# Patient Record
Sex: FEMALE | Race: WHITE
[De-identification: names, ages, dates, MRNs, and addresses within clinical notes are randomized per-mention and may not be internally consistent; named-entity substitution may affect disease eponyms.]

---

## 2017-07-25 NOTE — CPEKG
Heart Rate: 68

RR Interval: 882

P-R Interval: 176

QRSD Interval: 90

QT Interval: 404

QTC Interval: 430

P Axis: 64

QRS Axis: 3

T Wave Axis: 33

EKG Severity - NORMAL ECG -

EKG Impression: SINUS RHYTHM

Electronically Signed By: Teo Sharma 27-Jul-2017 11:23:19

## 2017-07-25 NOTE — EDPHY
H & P


Stated Complaint: here for multi system complaints- please call  [phone]


Source: Patient





- Personal History


Current Tetanus Diphtheria and Acellular Pertussis (TDAP): Unsure





- Medical/Surgical History


Hx Asthma: Yes


Hx Chronic Respiratory Disease: No


Hx Diabetes: No


Hx Cardiac Disease: No


Hx Renal Disease: No


Hx Cirrhosis: No


Hx Alcoholism: No


Hx HIV/AIDS: No


Hx Splenectomy or Spleen Trauma: No


Other PMH: aortic iliac stents, cardiac cath 10/5/2015,angioseal, laminectomy, 

right knee injury, LEFT SHOULDER SURG.  chronic pain/PTSD/HEad injury





- Family History


Significant Family History: No pertinent family hx





- Social History


Smoking Status: Former smoker


Time Seen by Provider: 07/25/17 13:28


HPI/ROS: 





CHIEF COMPLAINT:  Sore throat, cough, leg swelling





History by patient





HISTORY OF PRESENT ILLNESS:  57-year-old woman with a history of coronary 

artery disease, bilateral femoral stents, former smoker he has been having 

several months of scratchy throat, hoarse voice and cough and has been on 

multiple rounds of antibiotics presents today because while she was on vacation 

she noticed new leg swelling and she has had persistent cough and white exudate 

in her throat. Patient has been using inhalers and nebulizers felt relief until 

last night when the nebulizer seem to help somewhat.  She denies any fever.  

She also states that she has been having frequent urination.  Patient is 

somewhat angry and scattered in her history and states that she has a history 

of a head injury so getting a clear history is difficult.





REVIEW OF SYSTEMS:


As in HPI, and all other systems reviewed and are negative (Julia Ling)





- Physical Exam


Exam: 





General Appearance:  Alert, nontoxic appearing, speaking full sentences.


Eyes:  Pupils equal and round no pallor or injection.


ENT, Mouth:  Mucous membranes moist.


Respiratory:  Normal, effort, lungs are clear to auscultation. Diffuse wheezes 

and rhonchi.


Cardiovascular:  Regular rate and rhythm. S1, S2


Gastrointestinal:  Abdomen is soft and nontender, no masses, bowel sounds 

normal.


Back: No CVA tenderness, no bony tenderness


Neurological:  Awake, alert and oriented x 3, no pronator drift, normal gait, 

no pronator drift


Skin:  Warm and dry, no rashes.


Musculoskeletal:  Neck is supple nontender. No deformities.


Extremitie:s full range of motion, 1+ pitting edema to the knees, DP and PT 

pulses 2+ and equal bilaterally, feet are warm


Psychiatric:  Patient has normal affect, there is no agitation. (Julia Ling)


Constitutional: 


 Initial Vital Signs











Temperature (C)  36.6 C   07/25/17 13:11


 


Heart Rate  85   07/25/17 13:11


 


Respiratory Rate  20   07/25/17 13:11


 


Blood Pressure  125/76 H  07/25/17 13:11


 


O2 Sat (%)  95   07/25/17 13:11








 











O2 Delivery Mode               Room Air














Allergies/Adverse Reactions: 


 





Penicillins Allergy (Severe, Verified 05/22/16 11:44)


 ANAPHYLACTIC


meperidine HCl [From Demerol] Allergy (Intermediate, Verified 05/22/16 11:44)


 ANXIOUS


prednisone [Prednisone] Allergy (Intermediate, Verified 05/22/16 11:44)


 Hives


Sulfa (Sulfonamide Antibiotics) Allergy (Intermediate, Verified 05/22/16 11:44)


 NAUSEA


clopidogrel bisulfate [From Plavix] Allergy (Verified 05/22/16 11:44)


 


Quinolones Allergy (Verified 05/22/16 11:44)


 


ofptiray dye Allergy (Uncoded 04/05/16 13:09)


 


steroids Allergy (Uncoded 04/05/16 13:09)


 








Home Medications: 














 Medication  Instructions  Recorded


 


Aspirin [Aspirin 81mg (*)] 81 mg PO DAILY 10/05/15


 


Diclofenac Sodium 1% [Voltaren Gel 2 bessie TP QID 10/05/15





(*)]  


 


Ergocalciferol (Vitamin D2) 400 unit PO DAILY 10/05/15





[Vitamin D2]  


 


Herbals/Supplements -Info Only 1 ea PO DAILY 10/05/15


 


Multivitamins [Multivitamin (*)] 1 each PO DAILY 10/05/15


 


Pantoprazole Sodium [Protonix 40mg 40 mg PO BID 10/05/15





(*)]  


 


clonazePAM [Klonopin (*)] 0.5 mg PO QID 10/05/15


 


oxyCODONE IR [Oxycodone Ir (*)] 30 mg PO Q4H PRN 10/05/15


 


Hydromorphone HCl [Exalgo] 12 mg PO DAILY 10/06/15


 


Effient  10/22/15


 


Neurontin  10/22/15


 


Clindamycin HCl [Clindamycin] 300 mg PO 05/22/16


 


Estradiol [Estradiol 1 MG (RX)] 1 mg PO DAILY 05/22/16


 


Ondansetron Odt [Zofran Odt] 4 mg PO Q6 PRN #10 tab 05/22/16














Medical Decision Making





- Diagnostics


Imaging Results: 


 Imaging Impressions





Chest X-Ray  07/25/17 13:59


Impression: Minimal left basilar atelectasis. Otherwise negative.











ED Course/Re-evaluation: 





57-year-old woman with coronary disease and vascular disease who presents today 

with multitude of complaints, in particular cough who is well appearing, 

without evidence of hypoxia or systemic toxicity and normal vital signs. In 

order to make sure that the patient's pulmonary symptoms and leg swelling were 

not related to underlying heart failure ECG, chest x-ray and labs were 

performed.  ECG showed no evidence of acute ischemia.  Chest x-ray showed 

evidence of hyperinflation but no evidence of pneumonia or CHF.  I transferred 

care to Dr. Kwok pending results patient's laboratory tests for final 

disposition. (Julia Ling)





Patient's care transferred to me at 3:30 p.m..  Discussed with the patient the 

results of her blood tests and x-rays.  Overall she is well-appearing and has a 

normal exam and vital signs. I suggested that her wheezing that improved with 

albuterol and lack of pulmonary findings on x-ray other than hyperinflation 

suggests COPD.  She has a history of smoking and currently smells like smoke.  

She denies this.  I offered to treat with a short course of steroids which she 

refused because she states that steroids make her feel crazy.  We discussed 

long versus short-term use.  She states that she has been on multiple 

antibiotics recently provided by her primary physician and that they have not 

improved her symptoms. She also wanted to provide a stool sample to be checked 

for C diff.  This was done.  She also asked me why her face was bruising after 

Botox injections and we discussed the possible complications and side effects 

of such injections.  She is not happy that we do not have any other options for 

treatment.  She is essentially refusing my treatment plan and does not agree 

with my diagnosis of COPD.  I will refer her back to her primary doctor 

[facility] at this time.  We discussed indications for returning to the 

emergency department. (Jamil Kwok)


Differential Diagnosis: 





Partial list of the Differential diagnosis considered include but were not 

limited to;  anxiety, COPD, bronchitis, pneumonia, bruising and although 

unlikely based on the history and physical exam, I also considered PE, acute 

coronary disease, DVT, cellulitis.  I discussed these differential diagnoses 

and the plan with the patient as well as the usual and expected course.  The 

patient understands that the diagnosis is provisional and that in medicine we 

are not always correct and that further workup is often warranted.  Usual and 

customary warnings were given.  All of the patient's questions were answered.  

The patient was instructed to return to the emergency department should the 

symptoms at all worsen or return, otherwise to followup with the physician as 

we discussed. (Jamil Kwok)





- Data Points


Laboratory Results: 


 Laboratory Results





 07/25/17 14:45 





 07/25/17 14:45 





 











  07/25/17 07/25/17 07/25/17





  Unknown 15:05 14:45


 


WBC      





    


 


RBC      





    


 


Hgb      





    


 


Hct      





    


 


MCV      





    


 


MCH      





    


 


MCHC      





    


 


RDW      





    


 


Plt Count      





    


 


MPV      





    


 


Neut % (Auto)      





    


 


Lymph % (Auto)      





    


 


Mono % (Auto)      





    


 


Eos % (Auto)      





    


 


Baso % (Auto)      





    


 


Nucleat RBC Rel Count      





    


 


Absolute Neuts (auto)      





    


 


Absolute Lymphs (auto)      





    


 


Absolute Monos (auto)      





    


 


Absolute Eos (auto)      





    


 


Absolute Basos (auto)      





    


 


Absolute Nucleated RBC      





    


 


Immature Gran %      





    


 


Immature Gran #      





    


 


Sodium      





    


 


Potassium      





    


 


Chloride      





    


 


Carbon Dioxide      





    


 


Anion Gap      





    


 


BUN      





    


 


Creatinine      





    


 


Estimated GFR      





    


 


Glucose      





    


 


Calcium      





    


 


Magnesium      





    


 


Total Bilirubin      





    


 


Conjugated Bilirubin      





    


 


Unconjugated Bilirubin      





    


 


AST      





    


 


ALT      





    


 


Alkaline Phosphatase      





    


 


Troponin I      





    


 


NT-Pro-B Natriuret Pep      





    


 


Total Protein      





    


 


Albumin      





    


 


Urine Color    YELLOW   





    


 


Urine Appearance    CLEAR   





    


 


Urine pH    6.5   





    (5.0-7.5)  


 


Ur Specific Gravity    <= 1.005   





    (1.002-1.030)  


 


Urine Protein    NEGATIVE   





    (NEGATIVE)  


 


Urine Ketones    NEGATIVE   





    (NEGATIVE)  


 


Urine Blood    NEGATIVE   





    (NEGATIVE)  


 


Urine Nitrate    NEGATIVE   





    (NEGATIVE)  


 


Urine Bilirubin    NEGATIVE   





    (NEGATIVE)  


 


Urine Urobilinogen    0.2 EU EU  





    (0.2-1.0)  


 


Ur Leukocyte Esterase    NEGATIVE   





    (NEGATIVE)  


 


Urine RBC    NONE SEEN /hpf /hpf  





    (0-3)  


 


Urine WBC    NONE SEEN /hpf /hpf  





    (0-3)  


 


Ur Epithelial Cells    TRACE /lpf /lpf  





    (NONE-1+)  


 


Urine Glucose    NEGATIVE   





    (NEGATIVE)  


 


Group A Strep Screen      NEGATIVE 





     (NEGATIVE) 


 


Group A Strep DNA  Pending     





    














  07/25/17 07/25/17





  14:45 14:45


 


WBC    5.57 10^3/uL 10^3/uL





    (3.80-9.50) 


 


RBC    3.87 10^6/uL L 10^6/uL





    (4.18-5.33) 


 


Hgb    12.7 g/dL g/dL





    (12.6-16.3) 


 


Hct    36.4 % L %





    (38.0-47.0) 


 


MCV    94.1 fL fL





    (81.5-99.8) 


 


MCH    32.8 pg pg





    (27.9-34.1) 


 


MCHC    34.9 g/dL g/dL





    (32.4-36.7) 


 


RDW    12.5 % %





    (11.5-15.2) 


 


Plt Count    178 10^3/uL 10^3/uL





    (150-400) 


 


MPV    9.0 fL fL





    (8.7-11.7) 


 


Neut % (Auto)    60.7 % %





    (39.3-74.2) 


 


Lymph % (Auto)    27.3 % %





    (15.0-45.0) 


 


Mono % (Auto)    9.9 % %





    (4.5-13.0) 


 


Eos % (Auto)    0.5 % L %





    (0.6-7.6) 


 


Baso % (Auto)    1.1 % %





    (0.3-1.7) 


 


Nucleat RBC Rel Count    0.0 % %





    (0.0-0.2) 


 


Absolute Neuts (auto)    3.38 10^3/uL 10^3/uL





    (1.70-6.50) 


 


Absolute Lymphs (auto)    1.52 10^3/uL 10^3/uL





    (1.00-3.00) 


 


Absolute Monos (auto)    0.55 10^3/uL 10^3/uL





    (0.30-0.80) 


 


Absolute Eos (auto)    0.03 10^3/uL 10^3/uL





    (0.03-0.40) 


 


Absolute Basos (auto)    0.06 10^3/uL 10^3/uL





    (0.02-0.10) 


 


Absolute Nucleated RBC    0.00 10^3/uL 10^3/uL





    (0-0.01) 


 


Immature Gran %    0.5 % %





    (0.0-1.1) 


 


Immature Gran #    0.03 10^3/uL 10^3/uL





    (0.00-0.10) 


 


Sodium  136 mEq/L mEq/L  





   (134-144)  


 


Potassium  4.1 mEq/L mEq/L  





   (3.5-5.2)  


 


Chloride  102 mEq/L mEq/L  





   ()  


 


Carbon Dioxide  24 mEq/l mEq/l  





   (22-31)  


 


Anion Gap  10 mEq/L mEq/L  





   (8-16)  


 


BUN  11 mg/dL mg/dL  





   (7-23)  


 


Creatinine  0.7 mg/dL mg/dL  





   (0.6-1.0)  


 


Estimated GFR  > 60   





   


 


Glucose  96 mg/dL mg/dL  





   ()  


 


Calcium  8.3 mg/dL L mg/dL  





   (8.5-10.4)  


 


Magnesium  1.8 mg/dL mg/dL  





   (1.6-2.3)  


 


Total Bilirubin  0.4 mg/dL mg/dL  





   (0.1-1.4)  


 


Conjugated Bilirubin  0.2 mg/dL mg/dL  





   (0.0-0.5)  


 


Unconjugated Bilirubin  0.2 mg/dL mg/dL  





   (0.0-1.1)  


 


AST  22 IU/L IU/L  





   (14-46)  


 


ALT  39 IU/L IU/L  





   (9-52)  


 


Alkaline Phosphatase  49 IU/L IU/L  





   ()  


 


Troponin I  < 0.012 ng/mL ng/mL  





   (0-0.034)  


 


NT-Pro-B Natriuret Pep  371 pg/mL H pg/mL  





   (0-125)  


 


Total Protein  5.8 g/dL L g/dL  





   (6.3-8.2)  


 


Albumin  3.5 g/dL g/dL  





   (3.5-5.0)  


 


Urine Color    





   


 


Urine Appearance    





   


 


Urine pH    





   


 


Ur Specific Gravity    





   


 


Urine Protein    





   


 


Urine Ketones    





   


 


Urine Blood    





   


 


Urine Nitrate    





   


 


Urine Bilirubin    





   


 


Urine Urobilinogen    





   


 


Ur Leukocyte Esterase    





   


 


Urine RBC    





   


 


Urine WBC    





   


 


Ur Epithelial Cells    





   


 


Urine Glucose    





   


 


Group A Strep Screen    





   


 


Group A Strep DNA    





   











Medications Given: 


 








Discontinued Medications





Albuterol/Ipratropium (Duoneb)  3 ml IH EDNOW ONE


   Stop: 07/25/17 14:03


   Last Admin: 07/25/17 14:59 Dose:  3 ml








Departure





- Departure


Disposition: Home, Routine, Self-Care


Clinical Impression: 


Reactive airway disease


Qualifiers:


 Asthma severity: mild persistent Asthma complication type: with acute 

exacerbation Qualified Code(s): J45.31 - Mild persistent asthma with (acute) 

exacerbation





Condition: Fair


Instructions:  Reactive Airways Disease (ED)


Referrals: 


NONE *PRIMARY CARE P,. [Primary Care Provider] - As per Instructions


Severiano Kennedy MD [Medical Doctor] - As per Instructions

## 2018-08-26 NOTE — GHP
[f rep st]



                                                            HISTORY AND PHYSICAL





DATE OF ADMISSION:  08/26/2018



CHIEF COMPLAINT:  Headache.



HISTORY OF PRESENT ILLNESS:  This is a 58-year-old female with a history of multiple spinal surgeries
 who presents with a headache.  This started on August 1st suddenly.  Since then, she has seen her [facility] Physician a few times.  She had a brain MRI, which was performed here on August 2nd.  This
 showed a cyst in the inferior aspect of the right basal ganglia, but nothing additional.  She additi
onally saw Dr. Paul Schofield who performed a laryngoscopy multiple times looking for a sinus infection.
  He did not see anything clear.  She has had a few courses of Z-Shyam without improvement.  She takes 
chronic narcotics, has continued these including morphine 60 twice daily as well as oxycodone multipl
e times throughout the day.  She has a history of a cervical spine fusion in 2001.  This was C5 and 6
.  Her headache is associated with multiple additional complaints including nausea, vomiting, she tho
ught she was constipated at one point, she has chronic numbness in her upper extremities, which is wo
rse at night.  She does report new bilateral symmetric upper extremity weakness.  She has not had any
 changes in her vision.  She describes the pain being on the right side of her head and face, which i
s worse when she turns her head to the right or to the left.



PAST MEDICAL/SURGICAL HISTORY:  

1.  Chronic pain on continuous narcotics.

2.  Peripheral vascular disease as well as coronary artery disease, no intervention.

3.  Aortic insufficiency.

4.  C5-6 fusion.

5.  Lumbar stenosis status post fusion.

6.  Arthritis.

7.  GERD.

8.  Hyperlipidemia.



MEDICATIONS:  Please see medication reconciliation.



ALLERGIES:  Penicillin, Demerol, prednisone, sulfa, Plavix, quinolones, steroids.



FAMILY HISTORY:  Reviewed and noncontributory.



SOCIAL HISTORY:  She lives by herself.  She does not drink or smoke.  She is a retired nurse.  She us
ed to work at [employer].



REVIEW OF SYSTEMS:  10-point Review of Systems is conducted and is negative except per HPI.



PHYSICAL EXAM:  VITAL SIGNS:  Blood pressure 133/77, heart rate 64, respiration rate 18, saturating 9
3% on room air.  Temperature 36.6.  GENERAL:  The patient is a pleasant female who appears somewhat u
ncomfortable intermittently holding the right side of her face in mild distress.  HEENT:  Shows her t
o be normocephalic, atraumatic.  CARDIOVASCULAR:  Regular rate and rhythm.  There are no murmurs, rub
s, or gallops.  PULMONARY:  Lungs clear to auscultation bilaterally.  ABDOMEN:  Soft, nontender, nond
istended.  SKIN:  Shows no rash.  :  No Moody.  NEUROLOGIC EXAM:  Shows her to be alert and oriente
d x3.  Cranial nerves 2 through 12 are grossly intact minus some subjective numbness on the right kenny
e of her face.  Strength in her biceps and triceps is weak, but symmetric bilaterally in her hands th
at is strong.  She has no subjective sensation loss in her upper extremities.  PSYCHIATRIC: Shows her
 to be mildly distressed, otherwise, her mood and affect are normal.



LABORATORY DATA:  CBC is normal.  ESR is 10, CRP is 6, basic metabolic panel is normal.



DATA:  

1.  Discussed with Lucy Nicole, will admit to med/surg with a Neurosurgery consult.

2.  I personally reviewed and interpreted her chest x-ray.  This shows mild bronchitis.

3.  I reviewed her head CT scan.  This shows no sinusitis, nothing acute.

4.  I reviewed her head and neck CT angiograms.  This shows C4 through 5 moderate central canal steno
sis with severe right neural foraminal stenosis, with both the CT angiogram portion of the head and n
da being normal.



IMPRESSION AND PLAN:  

1.  Headache/neck pain:  Unclear etiology.  She has a right basal ganglia cyst, which I do not believ
e it is causing this pain.  She does have a right-sided C4-5 foraminal stenosis, this is potentially 
implicated.  Will order an MRI of her cervical spine to further evaluate.  Dr. Garcia with Neurosurger
y has already been consulted, he will see the patient in consultation.  She does not have any red fla
g neurologic signs.  Could consider involving Neurology if her neck pathology is not felt to be the c
ause of her pain.  Otherwise for now, will consider high dosed outpatient narcotics give her addition
al medications for pain relief.

2.  Chronic pain on continuous narcotics:  We will continue these.

3.  Gastroesophageal reflux disease:  Protonix.

4.  Peripheral vascular disease and coronary artery disease:  I do not see that she is on aspirin.  JÚNIOR daniels discuss this with her further.

5.  Venous thromboembolism risk is moderate.  She is admitted, and obvious if she stays beyond 1 day 
she would need venous thromboembolism prophylaxis.





Job #:  [id number]

## 2018-08-26 NOTE — EDPHY
H & P


Smoking Status: Former smoker


Time Seen by Provider: 08/26/18 09:27


HPI/ROS: 





CHIEF COMPLAINT:  Headache, dizziness, weakness





HISTORY OF PRESENT ILLNESS:  58-year-old female presents to the emergency 

department by private vehicle complaining of severe headache and dizziness.  

Symptoms began over 3 weeks ago.  She states that she was getting out of a car 

and developed dizziness and pain in the right side of her head.  She has seen 

by a neurologist.  She has been seen by ENT twice.  She has seen her primary 

care provider for this multiple times.  She states "I just can't take it anymore

".  She denies any reported trauma.  She was told that this could be related to 

a sinus infection and has bee on 2 rounds of antibiotics.  The she states that 

this all started shortly after she had some allergy testing which caused her to 

have multiple episodes of vomiting.  She has not vomited since this headache 

started.  She has taken numerous allergy medications without relief.  No visual 

changes.  She has chronic neck and back pain.  She feels like her neck is very 

stiff.  Denies abdominal pain.  She overall feels weak





REVIEW OF SYSTEMS:


Constitutional:  No fever, no chills.


Eyes:  No double or blurry vision.


ENT:  No sore throat.


Respiratory:  No cough, no shortness of breath.


Cardiac:  No chest pain.


Gastrointestinal:  No abdominal pain, vomiting or diarrhea.


Genitourinary:  No dysuria.


Musculoskeletal:  No neck or back pain.


Skin:  No rashes.


Neurological:  headache. (KristyLucy)


Past Medical/Surgical History: 





Chronic neck and back pain, narcotic dependence, aortic iliac stents, cardiac 

catheterization 2015, orthopedic surgeries, laminectomy, head injury, PTSD (

DaniaLucy de leon)


Social History: 





Single and lives in [place].  She is retired nurse. (DaniaLucy de leon)


Physical Exam: 





General Appearance:  Alert, no distress.  Resting a darkened room.


Eyes:  Pupils equal and round.  Extraocular motions are all intact.


ENT:  Mouth:  Mucous membranes moist.


Respiratory:  No wheezing, rhonchi, or rales, lungs are clear to auscultation.


Cardiovascular:  Regular rate and rhythm.


Gastrointestinal:  Abdomen is soft and nontender, no masses, no rebound or 

guarding, bowel sounds normal.


Neurological:  Alert and oriented x 3, cranial nerves II through XII grossly 

intact


Skin:  Warm and dry, no rashes.


Musculoskeletal:  Nontender to palpate along the cervical, thoracic or lumbar 

spine.  Neck is supple.


Extremities:  Full range of motion and no peripheral edema.


Psychiatric:  Patient is oriented X 3, there is no agitation. (Lucy Wagner)


Constitutional: 





 Initial Vital Signs











Temperature (C)  36.7 C   08/26/18 08:42


 


Heart Rate  101 H  08/26/18 08:42


 


Respiratory Rate  18   08/26/18 08:42


 


Blood Pressure  158/84 H  08/26/18 08:42


 


O2 Sat (%)  96   08/26/18 08:42








 











O2 Delivery Mode               Room Air


 


O2 (L/minute)                  2














Allergies/Adverse Reactions: 


 





Penicillins Allergy (Severe, Verified 08/26/18 08:45)


 ANAPHYLACTIC


meperidine HCl [From Demerol] Allergy (Intermediate, Verified 08/26/18 08:45)


 ANXIOUS


prednisone [Prednisone] Allergy (Intermediate, Verified 08/26/18 08:45)


 Hives


Sulfa (Sulfonamide Antibiotics) Allergy (Intermediate, Verified 08/26/18 08:45)


 NAUSEA


clopidogrel bisulfate [From Plavix] Allergy (Verified 08/26/18 08:45)


 


ioversol [From Optiray 160] Allergy (Verified 08/27/18 16:30)


 


Quinolones Allergy (Verified 08/26/18 08:45)


 


steroids Allergy (Uncoded 04/05/16 13:09)


 








Home Medications: 














 Medication  Instructions  Recorded


 


Albuterol [Proventil Inhaler HFA 1 - 2 puffs IH Q4H PRN 08/26/18





(*)]  


 


Diclofenac Sodium 1% [Voltaren Gel 4 gm TP QID PRN 08/26/18





(*)]  


 


Fluticasone/Salmeter 250/50Mcg 1 puffs IH BID PRN 08/26/18





[Advair 250/50 (*)]  


 


Naratriptan HCl [Amerge] 2.5 mg PO ONCE PRN 08/26/18


 


Pantoprazole Sodium [Protonix 40mg 40 mg PO BIDMEAL 08/26/18





(*)]  


 


morphINE SR [MS Contin/Oramorph 60 60 mg PO BID@06,18 08/26/18





mg (*)]  


 


oxyCODONE IR [Oxycodone Ir (*)] 10 mg PO HS PRN 08/26/18


 


oxyCODONE IR [Oxycodone Ir (*)] 10 mg PO QID@06,10,14,18 08/26/18














Medical Decision Making





- Diagnostics


Imaging: Discussed imaging studies w/ On call Radiologist, I viewed and 

interpreted images myself





- Diagnostics


Imaging Results: 





 Imaging Impressions





Therapeutic Injection  08/27/18 08:37


Impression:  C7-T1 epidural steroid injection performed, as above.








Face CT  08/27/18 15:02


Impression:  


1.  Suspect dental caries involving the crowns of the right 1st and 2nd 

premolars in the mandibular arch (#28 and 29).


2.  No evidence of osteomyelitis, subperiosteal abscess, or mass.


3.  Patent venous system and carotid arteries.


 


Findings discussed with physician, Jt Duran M.D., on August 27, 2018 at 

1752.


 


 


 











ED Course/Re-evaluation: 





58-year-old female presents emergency department with severe chronic right-

sided headache for the last 3-4 weeks.  She has been seen by neurologist as 

well as primary care provider multiple times.  She has been scoped by ENT 

twice.  She has been on multiple antibiotics.





CT imaging of the brain as well as CTA of the head neck reveals no evidence of 

intracranial bleeding.  She has significant degenerative changes in the 

cervical spine and specifically has right-sided severe neural foraminal 

stenosis at C4-5.





The patient was given IV 5 mg Valium without relief.





Patient has a history of chronic pain and she took her own morphine and 

oxycodone as scheduled.





The patient does not feel that her pain is adequately controlled.  She does not 

feel comfortable going home.  Patient will be admitted to the hospitalist for 

pain management.  I also spoke with the on-call neurosurgeon, Dr. Thierry Garcia

, who reviewed will review the patient's CT scan but does not feel that her 

headache is likely related to degenerative changes in her cervical spine.  She 

likely will require MRI of the cervical spine while she is in the hospital.  I 

do not think these images need to be done in the emergency department.





ESR and CRP are both within normal limits.  CBC and chemistries are normal.





The case was discussed with Dr. Paul Blackman, secondary supervising physician, 

who did not directly evaluate the patient but agrees with treatment and plan. (

Lucy Wagner)





I did not see this patient while she was in the emergency department.  However 

her care was discussed with the PA while the patient was in the department.  I 

agree with treatment plan and management (Paul Blackman)


Differential Diagnosis: 





Headache including but not limited to subarachnoid hemorrhage, migraine headache

, tension headache and infectious causes such as meningitis, pharyngitis and 

sinusitis. (Lucy Wagner)





- Data Points


Laboratory Results: 





 Laboratory Results





 08/26/18 09:53 





 08/26/18 09:53 








Medications Given: 





 





Hydrocodone Bitart/Acetaminophen (Norco 5/325)  1 - 2 tab PO Q3HRS PRN


   PRN Reason: Pain, Moderate Able to Take PO


   Stop: 09/05/18 16:36


   Last Admin: 08/27/18 15:54 Dose:  2 tab


Diazepam (Valium)  5 mg PO Q6HRS PRN


   PRN Reason: Anxiety, Able to Take PO


   Stop: 02/23/19 15:01


   Last Admin: 08/27/18 15:53 Dose:  5 mg


Doxycycline Hyclate (Doxycycline Hyclate)  100 mg PO BID JAMES


   PRN Reason: Protocol


   Stop: 09/26/18 20:59


   Last Admin: 08/27/18 21:03 Dose:  100 mg


Hydromorphone HCl (Dilaudid)  0.5 - 1 mg IVP Q2HRS PRN


   PRN Reason: Pain, Severe


   Stop: 09/05/18 16:36


   Last Admin: 08/27/18 12:11 Dose:  1 mg


Methocarbamol (Robaxin)  750 mg PO TID PRN


   PRN Reason: muscle pain


   Stop: 02/22/19 21:59


   Last Admin: 08/27/18 21:03 Dose:  750 mg


Morphine Sulfate (Ms Contin/Oramorph)  60 mg PO BID@06,18 JAMES


   Stop: 09/05/18 17:59


   Last Admin: 08/27/18 18:14 Dose:  60 mg


Oxycodone HCl (Oxycodone Ir)  5 - 10 mg PO Q3HRS PRN


   PRN Reason: Pain, Severe Able to Take PO


   Stop: 09/05/18 16:36


   Last Admin: 08/27/18 21:03 Dose:  10 mg


Oxycodone HCl (Oxycodone Ir)  10 mg PO QID@06,10,14,18 JAMES


   Stop: 09/05/18 17:59


   Last Admin: 08/27/18 18:14 Dose:  10 mg


Pantoprazole Sodium (Protonix)  40 mg PO BIDMEAL JAMES


   Stop: 02/22/19 17:59


   Last Admin: 08/27/18 18:14 Dose:  40 mg





Discontinued Medications





Clonazepam (Klonopin)  0.5 mg PO ONCE ONE


   Stop: 08/26/18 20:24


   Last Admin: 08/26/18 21:58 Dose:  0.5 mg


Diazepam (Valium)  5 mg IVP EDNOW ONE


   Stop: 08/26/18 14:15


   Last Admin: 08/26/18 14:18 Dose:  5 mg


Diphenhydramine HCl (Benadryl Injection)  25 mg IVP EDNOW ONE


   Stop: 08/26/18 11:01


   Last Admin: 08/26/18 11:07 Dose:  25 mg


Diphenhydramine HCl (Benadryl Injection)  50 mg IVP ONCE ONE


   Stop: 08/27/18 15:47


   Last Admin: 08/27/18 15:53 Dose:  50 mg


Hydromorphone HCl (Dilaudid)  1 mg IVP EDNOW ONE


   Stop: 08/26/18 10:05


   Last Admin: 08/26/18 10:12 Dose:  1 mg


Morphine Sulfate (Ms Contin/Oramorph)  60 mg PO ONCE ONE


   Stop: 08/26/18 11:01


   Last Admin: 08/26/18 11:36 Dose:  60 mg


Oxycodone HCl (Oxycodone Ir)  10 mg PO EDNOW ONE


   Stop: 08/26/18 11:03


   Last Admin: 08/26/18 11:06 Dose:  10 mg


Oxycodone HCl (Oxycodone Ir)  10 mg PO ONCE ONE


   Stop: 08/26/18 15:40


   Last Admin: 08/26/18 15:44 Dose:  10 mg








Departure





- Departure


Disposition: [facility]


Clinical Impression: 


 Dizziness





Headache


Qualifiers:


 Headache type: unspecified Headache chronicity pattern: acute headache 

Intractability: intractable Qualified Code(s): R51 - Headache





Condition: Good

## 2018-08-27 NOTE — ASMTCMCOM
CM Note

 

CM Note                       

Notes:

Pt admitted with headached and rt sided neck pain. Neurosurgery consulting. DC plan is TBD.

 

Date Signed:  08/27/2018 11:25 AM

Electronically Signed By:Nu Lares LCSW

## 2018-08-27 NOTE — PDMN
Medical Necessity


Medical necessity: [facility] GR57 y/o w/ 

severe H/A and neck pain of unclear etiology, noted to have right basal ganglia 

cyst, prob unrelated, right sided C4-5 formaminal stenosis, neurosurgery 

consult - will treat with steroid injection via IR but cont to watch, if does 

not work need further workup from other specialists.  PT/OT ordered, manage 

pain with high dose opioids, hx PVD and CAD, chronic pain on narcotics, C5-6 

fusion.  Status change to inpatient 18 @ 1504 per MD order to cont to 

monitor, dx and treat

## 2018-08-27 NOTE — GCON
[f rep st]



                                                                    CONSULTATION





NEUROSURGERY CONSULTATION NOTE





CHIEF COMPLAINT:  Neck pain and scapular pain.



HISTORY OF PRESENT ILLNESS:  This is a 58-year-old female who has a prior cervical fusion at C5-6, th
at was performed in 1999 and 2001 by an outside surgeon.  She states that she was doing well until Au
gust 1st, when she states that she stepped out of her car and had extreme right-sided neck pain and r
ight-sided arm pain that radiated to mostly her scapular region.  The patient also states that she ha
s had some swelling and pain in the right side of her face, as well as her ear.  She came in to the 
ospital last night with more severe worsening of this pain.  Neurosurgery service was consulted origi
gisel for the patient having a severe headache, but later we were consulted due to some adjacent leve
l segment disease that was seen on her CT scan at C4-5.  Currently the patient states that she has aw
ful pain all over.  She states that she does have weakness in her arms, but also states that she has 
a biceps tendon rupture in her right arm, and this contributes to some of her weakness in her right a
rm.  Mostly, she states she has excruciating pain in the right side of her neck going down to her sca
pular region, and sometimes all the way down her arm into her hands.  She denies any inciting event, 
such as any falls or recent trauma.  She denies any loss of bowel or bladder control.  She denies any
 problems with her legs.



REVIEW OF SYSTEMS:  All pertinent positive and negative review of systems are as stated in the HPI.



PAST MEDICAL HISTORY:  The patient has a past medical history of cervical stenosis with radiculopathy
 at C5-6 and chronic pain, peripheral vascular disease, as well as coronary artery disease, aortic in
sufficiency, lumbar stenosis, status post fusion, arthritis, GERD, and hyperlipidemia.



CURRENT HOME MEDICATIONS:  Please see the current home medication reconciliation report.



FAMILY HISTORY:  Reviewed and is noncontributory to this admission.



SOCIAL HISTORY:  Patient lives by herself.  She denies any alcohol or tobacco use.  She states that s
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



ALLERGIES:  Patient has allergies to penicillin, demerol, prednisone, sulfa, Plavix, quinolones, and 
steroids except for Kenalog.



OBJECTIVE:  VITAL SIGNS:  Blood pressure 101/55, heart rate 63, respiratory rate 16, O2 saturation is
 94% on room air, temperature is 36.8 degrees Celsius.  CONSTITUTIONAL:  Patient is an alert and orie
nted, well-developed, well-nourished female, in no acute distress.  She is speaking fluently.  She is
 moving all extremities x4 to command.  HEENT:  Head is normocephalic, atraumatic.  Eyes:  Pupils are
 equal and reactive to light and accommodation.  Extraocular muscles are intact.  NECK:  Tender to pa
lpation over the right paracervical muscles in midline.  She is less tender over the left side of her
 cervical spine and paracervical muscles.  She does have full range of motion, but this is with pain.
  MUSCULOSKELETAL:  Gross neurologic motor exam bilateral upper extremities are 5/5, equal in strengt
h, except for her right biceps, which she noted to have a biceps tendon rupture.  Her arms often milton
e during her exam due to pain.  Otherwise though her muscle groups are 5/5 and equal in strength in d
eltoids, triceps, wrist extensors, flexors, interossei and .  There is negative Jimmie.  Deep t
endon reflexes are 2+ bilaterally in the triceps and the brachial radialis.  Bilateral lower extremit
ies are 5/5 and equal in strength in all muscle groups, including quadriceps, hamstrings, dorsiflexio
n, plantar flexion, and EHL.  EXTREMITIES:  There is no cyanosis or edema noted.  NEUROLOGIC:  Crania
l nerves 2 through 12 are grossly intact.  Tongue protrudes midline.  Palate rises symmetrically.  Th
ere is no facial droop.  Speech is fluent.  Hearing is grossly intact.  Accessory muscles are 5/5 and
 equal in strength.



LABORATORY DATA:  Laboratory data taken on 08/26/2018, shows a white blood cell count of 6.28, red ce
ll count 4.64, and platelets 181.  Sodium 139, potassium 4.5, chloride 108, carbon dioxide 24, BUN 14
, creatinine 0.7, glucose 89, calcium 9.4.  C-reactive protein 6.1.



DIAGNOSTIC IMAGING REVIEW:  MRI of the cervical spine was performed and shows worse central canal and
 right foraminal stenosis at C4-5 above the level of the patient's fusion.  Stable right C6-7 foramin
al stenosis. 



Head CT was performed and shows no hemorrhage or mass effect, and no sinusitis.  



Neck CTA was performed and shows mild atherosclerotic disease, left carotid bulb greater than right, 
without flow-limiting stenosis.  No evidence of carotid or vertebral flow-limiting stenosis, occlusio
n or dissection.  At C4-5, there is moderate central canal stenosis and moderate to severe right neur
al foraminal stenosis secondary to degenerative grade 1 retrolisthesis and right uncovertebral osteop
hytes.  There was a previous anterior cervical diskectomy and fusion at C5-6.  



CT angiogram of the brain shows a negative CT angiogram of the brain.



ASSESSMENT AND PLAN:  This is a 58-year-old female who presented with right-sided neck pain, headache
s and dizziness, who on MRI of the cervical spine shows some adjacent segment disease at C4-5, worse 
on the right with some severe central canal stenosis.  This can likely be the cause of some of her ne
ck pain and radiating pain to her scapula.  However, we did tell her that this is not likely the caus
e of her facial pain and would likely defer to her other doctors for workup of this.  I did discuss w
ith her the options of trying a steroid injection to try to relieve these symptoms.  She stated that 
she was allergic to most steroids, but could tolerate Kenalog.  I will order this injection and was h
opeful to get it by Interventional Radiology later today or at latest, tomorrow, to see if she gets a
 response from this.  If she gets a good response from this injection, hopefully, this would be all t
hat is needed.  If the relief from this is good but is short-lived, would likely require an adjacent 
level fusion sometime down the road, but would follow up with her as an outpatient for this.  I will 
order the injection today and we will follow along to see her response to this.  She should also part
icipate in PT and OT.  



The patient was seen by myself and Dr. Garcia this morning at approximately 7:30 a.m.  We will continu
e to follow along with this patient.





Job #:  [id number]

## 2018-08-27 NOTE — ASMTCMCOM
CM Note

 

CM Note                       

Notes:

Reviewed Neurosurgery's consultation as well as PT/OT evaluations.  PT recommending home vs home 

care.  Patient also received injection in IR today.  Attempted to meet with patient to discuss 

dispo planning, patient still experiencing severe pain and requested CM to return at a later 

time.  Plan remains to be determined at this time, CM will follow-up when patient is able to 

participate in discussion.



Plan:  TBD, possibly HC PT.



 

 

Date Signed:  08/27/2018 02:28 PM

Electronically Signed By:Kailyn Zavala RN

## 2018-08-27 NOTE — HOSPPROG
Hospitalist Progress Note


Assessment/Plan: 





59 yo F w prolonged HA and cervical stenosis





cervical stenosis: s/p steroid injection


   agree unlikely causative of HA





HA: she describes symptoms suggestive of multiple etiologies- odontogenic, tmj, 

muscle/tension


   large outpt workuop neg


   1. CT maxillofacial to r/o tooth infection


      start doxy


   2. valium prn, w trial now


   3. she declines neurontin





chronic pain: continue narcotics at home dose





proph: add lmwh





dispo: change to inpt


Subjective: prolonged HA.  CT images reviewed/interp by me


Objective: 


 Vital Signs











Temp Pulse Resp BP Pulse Ox


 


 36.6 C   58 L  16   108/64   96 


 


 08/27/18 09:17  08/27/18 12:04  08/27/18 12:04  08/27/18 12:04  08/27/18 12:04








 











 08/26/18 08/27/18 08/28/18





 05:59 05:59 05:59


 


Intake Total  1200 860


 


Balance  1200 860














- Physical Exam


Constitutional: no apparent distress


Eyes: PERRL, anicteric sclera


Ears, Nose, Mouth, Throat: moist mucous membranes, hearing normal


Cardiovascular: regular rate and rhythym, no murmur, rub, or gallop


Respiratory: no respiratory distress, no rales or rhonchi


Gastrointestinal: normoactive bowel sounds, soft, non-tender abdomen


Genitourinary: no bladder fullness, No ramirez in urethra


Skin: warm, normal color


Musculoskeletal: full muscle strength, no muscle tenderness


Neurologic: AAOx3


Psychiatric: interacting appropriately





ICD10 Worksheet


Patient Problems: 


 Problems











Problem Status Onset


 


Dizziness Acute  


 


Headache Acute

## 2018-08-28 NOTE — HOSPPROG
Hospitalist Progress Note


Assessment/Plan: 





59 yo F w prolonged HA and cervical stenosis





cervical stenosis: s/p steroid injection


   HA improved





HA: she describes symptoms suggestive of multiple etiologies- odontogenic, tmj, 

muscle/tension


   large outpt workup neg


   1. CT maxillofacial neg for infection


      images reviewed/interp by me


   2. valium prn, this has helped


   3. she declines neurontin





chronic pain: continue narcotics at home dose





proph: add lmwh





dispo: change to inpt


Subjective: pain improved w steroids injection.  anxious


Objective: 


 Vital Signs











Temp Pulse Resp BP Pulse Ox


 


 36.9 C   55 L  18   113/72   94 


 


 08/28/18 12:16  08/28/18 12:16  08/28/18 12:16  08/28/18 12:16  08/28/18 12:16








 











 08/27/18 08/28/18 08/29/18





 05:59 05:59 05:59


 


Intake Total  1350 


 


Output Total  900 


 


Balance  450 














- Physical Exam


Constitutional: no apparent distress, appears nourished


Eyes: PERRL, anicteric sclera


Ears, Nose, Mouth, Throat: moist mucous membranes, hearing normal


Cardiovascular: regular rate and rhythym, no murmur, rub, or gallop, systolic 

murmur


Respiratory: no respiratory distress, no rales or rhonchi


Gastrointestinal: normoactive bowel sounds, soft, non-tender abdomen


Genitourinary: no bladder fullness


Skin: warm


Musculoskeletal: full muscle strength





ICD10 Worksheet


Patient Problems: 


 Problems











Problem Status Onset


 


Dizziness Acute  


 


Headache Acute

## 2018-08-28 NOTE — NEUSURGPN
Assessment/Plan: 





A: 57 yo F s/p C7-T1 JUAN CARLOS with IR done yesterday for right sided neck pain. 

Imaging shows right C45 foraminal and central stenosis, prior C56 ACDF.





P:


-Pt received JUAN CARLOS yesterday and states her pain is improved slightly but she is 

now having a lot of neck stiffness. We discussed that the timeline for the 

injection to kick in fully can be 1-2 weeks and we would like to give the 

injection more time to take effect.


-Continue pain meds, muscle relaxants


-PT/OT


-Followup with Dr Garcia in 4 weeks in clinic for re-eval


-Call NS with any new/worsening issues


-NS will sign off and follow peripherally.


-D/w Dr Garcia


Subjective: 


Pt resting in bed, c/o neck stiffness. Denies any new weakness has tendon 

rupture in right arm. States the injection seems to have helped a little bit so 

far but she also has this new neck stiffness since the injection.


Objective: 


AAOx3


NAD


VSS


MAEx4


CN II-XII grossly intact


Motor 5/5 BUE


+LT


Urinary Catheter in Place: No





- Physician


Discussed Patient with Dr.: Garcia





Neurosurgery Physical Exam





- Vitals, I&O, Labs





 I and O











 08/27/18 08/28/18 08/29/18





 05:59 05:59 05:59


 


Intake Total  1350 


 


Output Total  900 


 


Balance  450 


 


Intake:   


 


  Oral (ml)  1350 


 


Output:   


 


  Urine (ml)  900 


 


    Toilet  900 


 


Other:   


 


  Intake Quantity  Yes 





  Sufficient   


 


  Number of Voids   


 


    Toilet  2 








 Vital Signs











Temp Pulse Resp BP Pulse Ox


 


 36.7 C   64   18   108/65   85 L


 


 08/28/18 04:00  08/28/18 04:00  08/28/18 04:00  08/28/18 04:00  08/28/18 06:39














ICD10 Worksheet


Patient Problems: 


 Problems











Problem Status Onset


 


Dizziness Acute  


 


Headache Acute

## 2018-08-29 NOTE — ASMTDCNOTE
Case Management Discharge

 

Discharge Order Complete?     Answers:  Yes                                   

Patient to Obtain             Answers:  Independently                         

Medications                                                                   

Transportation Arranged       Answers:  Other                         Notes:  self

Discharge Comments            

Notes:

Patient discharged home. She has daily caregiver support from [facility] and has notified her 

caregiver of her discharge. No other needs. 

 

Date Signed:  08/29/2018 10:03 AM

Electronically Signed By:Kari Johnston RN

## 2018-08-29 NOTE — HOSPPROG
Hospitalist Progress Note


Assessment/Plan: 





57 yo F w prolonged HA and cervical stenosis





cervical stenosis: s/p steroid injection


   HA improved





HA: she describes symptoms suggestive of multiple etiologies- odontogenic, tmj, 

muscle/tension


   large outpt workup neg


   1. CT maxillofacial neg for infection


      images reviewed/interp by me


   2. valium prn, this has helped


   3. she declines neurontin





chronic pain: continue narcotics at home dose





proph: add lmwh





dispo: home today


   > 30 minutes on dc


Subjective: feels better.  ready for dc


Objective: 


 Vital Signs











Temp Pulse Resp BP Pulse Ox


 


 36.7 C   79   16   137/82 H  94 


 


 08/29/18 08:42  08/29/18 08:42  08/29/18 08:42  08/29/18 08:42  08/29/18 08:42








 











 08/28/18 08/29/18 08/30/18





 05:59 05:59 05:59


 


Intake Total 1350 5700 


 


Output Total 900  


 


Balance 450 5700 














- Physical Exam


Constitutional: no apparent distress, appears nourished


Eyes: PERRL, anicteric sclera


Ears, Nose, Mouth, Throat: moist mucous membranes, hearing normal


Cardiovascular: regular rate and rhythym, no murmur, rub, or gallop


Respiratory: no respiratory distress, no rales or rhonchi


Gastrointestinal: normoactive bowel sounds, soft, non-tender abdomen


Genitourinary: no bladder fullness, No ramirez in urethra


Skin: warm, normal color


Musculoskeletal: full muscle strength


Neurologic: AAOx3


Psychiatric: interacting appropriately


Lymph, Heme, Immunologic: no cervical LAD





ICD10 Worksheet


Patient Problems: 


 Problems











Problem Status Onset


 


Dizziness Acute  


 


Headache Acute

## 2018-08-29 NOTE — GDS
[f rep st]



                                                             DISCHARGE SUMMARY





DISCHARGE DIAGNOSES:  

1.  Severe headache, likely tension/muscle headache.

2.  Central canal and right foraminal stenosis at C4-5 above the level of her C5-C6 prior fusion.

3.  Chronic pain with continuous narcotic therapy.



HOSPITAL COURSE:  Please see admission history and physical by Dr. Paul Blackman.  The patient was adm
itted on the morning of the 26th, with a headache that has been about a month in nature and severe.  
She had previously sought care a couple of times.  She had a CTA show mild atherosclerotic disease.  
C4-C5 central canal stenosis.  She was seen by Neurosurgery, who recommended a steroid injection, renetta arriaga she received with improvement, but it did take a couple of days.  Alternative causes were consider
ed.  It did not seem migrainous.  She was not febrile or did not have a significant leukocytosis or m
eningeal signs.  She had a CT of the face showing no evidence of odontogenic cause.  The patient, giv
en the combination of muscle relaxants and steroid injection, had significant improvement and is disc
harged home today with a prescription for muscle relaxants.  She was advised of the potential risk of
 combining those with narcotics, which she takes chronically.  She is discharged home with outpatient
 followup with Neurosurgery.  



Greater than 30 minutes spent on this discharge.





Job #:  [id number]

## 2018-09-20 NOTE — PDRADPN
Radiology Procedure Note


Date of Procedure: 09/20/18


Radiologist: Yesenia Wallace


Anesthesia: IV Sedation


Pre-op Diagnosis: NECK PAIN


Post-op Diagnosis: SAME


Indication: SEVERE RT SIDED PAIN


Procedure: C4-5 RT TFESI


Inf/Abcess present in the surg proc area at time of surgery?: No

## 2018-12-30 NOTE — PDGENHP
History and Physical


History and Physical: 





CC:  Back and abdominal pain





HISTORY:  This patient comes into the ER today complaining of left-sided back 

and flank abdominal pain that started 2 days ago.  She says that 3 days ago she 

tripped going up some stairs catching herself with her hands so that she did 

actually landed on the stairs with her trunk.  She did not immediately feeling 

pain but since 2 days ago she has had severe pain and spasm in the left side of 

the mid low back.  Today the pain comes around the flank toward the low left 

abdomen.  There is no radiation down into the leg.  There is no tingling or 

numbness or weakness.  Her pain is markedly exacerbated by any bending or 

twisting of the trunk.  There is no pain in bearing weight with the left leg 

and she can move her hip.  She is emptying her bladder without any change from 

usual and has had no dysuria or blood in the urine.  There is no fever.  She 

has no change in bowel function.





In addition today she is getting a flare up of her usual headaches which 

started in her neck on the right side and gradually worked the way up to the 

left side of her head and forehead.  These involve spasm and she attributes to 

these chronic neck pain and she has been working with Dr. Bwoen of Neurosurgery 

for this pain.  Today she says her headache pain is severe.





Both of her pains are described as severe and not responding to her usual pain 

medicines at home including 60 twice daily long-acting morphine and p.r.n. 

Oxycodone through the day.  She has also been taking ibuprofen and Tylenol and 

using hot and cold packs and getting no benefit from those.








ROS:  A comprehensive 10 system review revealed no other significant findings








PAST MEDICAL HISTORY:


Chronic pain syndrome related to degenerative disease the neck with previous 

neck surgery


Peripheral vascular disease


Coronary artery disease


Known aortic insufficiency


Cervical and lumbar fusion surgeries








FAMILY MEDICAL HISTORY:


No relevant family medical history





SOCIAL HISTORY:


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


No significant alcohol use





MEDICATIONS:


The patients list has been reconciled by our clinical pharmacist in the EMR.  I 

have reviewed the list and ordered appropriate medicines.





PHYSICAL EXAMINATION:


Vital Signs:  All stable without fever








Examination:


General: alert, oriented, good mentation, looks fairly uncomfortable despite 

having received significant analgesia medicines here in the ER


Skin: warm, dry, good color, no rash


HEENT: normal


Neck: no mass or jvd


Resps: relaxed


Lungs: clear breath sounds


Heart: regular, no murmur


Abdomen: soft, nondistended, nontender, +BS, no mass, no hernia palpable


Upper Extremities: normal


Lower Extremities:  With active or passive range of motion of the left hip 

there is pain in the mid left back without radiation to the leg, but no pain in 

the hip per se.  There is some tenderness at the lateral left hip that she 

attributes to some chronic bursitis.  No groin tenderness.  Otherwise legs are 

warm with no edema, and she has good color and distal pulses


No Bleeding or bruising


Neurologic: normal speech/language, normal CNs, no focal weakness


IV site: looks normal








LABORATORY DATA:


Unremarkable CBC, metabolic panel, and liver panel


A UA is remarkable for 1+ blood on dipstick but only 1-3 red cells on urine 

micro evaluation





RADIOLOGY STUDIES:


I reviewed images from CT scan of the abdomen pelvis:  There is no evidence of 

hematoma.  There are good images of her spine including the thoracic lumbar and 

sacral spine and pelvis and I see no evidence of any acute fracture or 

subluxation.  There is good imaging of her left hip and proximal femur and I do 

not see any fractures or other acute changes there.  There is no left 

hydronephrosis or stones that I can see in the left kidney contrasts normally 

and appears otherwise unremarkable to me.  There are no other concerning intra-

abdominal abnormalities and no retroperitoneal abnormalities of concern that I 

can see.  She does have a atherosclerosis and her previously placed iliac 

stents or visible








ASSESSMENT:


* Acute left mid back and flank pain appears to be muscular in origin and 

probably related to her fall at home


* Severe spasm


* Markedly impaired mobility due to her pain and spasm as above


* Fall at home with injury as above, mechanical fall having tripped


* Acute exacerbation of chronic muscular headache syndrome


* Chronic pain syndrome on chronic moderately high dose narcotics at home; this 

will make managing her current acute pain syndrome more difficult


* Chronic cervical spine pain issues, ongoing care with Dr. Bowen of 

Neurosurgery; no apparent acute injury or problems with the cervical spine 

other than flare-up of her usual headache


* Known history of peripheral vascular disease with iliac artery stents








PLANS:


* Observation status admission for intractable back and abdominal pain


* Heat and cold as needed


* Increase her dose of Robaxin


* Schedule doses of Toradol


* Continue her usual home narcotics


* Will add PCA for the time try and wean as soon as possible back to her usual 

outpatient regimen


* Antiemetics as needed


* Bed rest


* Physical occupational therapy








I have reviewed the patient's case in detail with Dr. Jaswant Mark


I have reviewed the patient's past medical records as part of this assessment, 

including previous hospital admission records here

## 2018-12-30 NOTE — EDPHY
HPI/HX/ROS/PE/MDM


Narrative: 





CHIEF COMPLAINT: "I have pain in my head and my back right now and I just want 

to scream."





HPI: The patient is a 57 y/o female with a history of vascular disease and 

chronic pain with continuous narcotic therapy complaining of severe lower back 

pain radiating around her left abdomen towards her groin for the last 2 days. 

She reports the headache has been recurrent over several months and is followed 

by her neurosurgeon. Her back pain is new and she has not had pain in this 

location previously. She denies urinary symptoms, weakness, paresthesias, fever

, vomiting, diarrhea, cough, chest pain, dyspnea, recent illness, or recent 

fever. 





REVIEW OF SYSTEMS:


A comprehensive 10 system review of systems is otherwise negative aside from 

elements mentioned in the history of present illness.





PMH: Chronic pain on continuous narcotic, lumbar spinal fusion 2013, C5-C6 

fusion, C4-C5 stenosis, peripheral and coronary vascular disease, aortic 

insufficiency, arthritis, GERD, hyperlipidemia, bilateral aortoiliac stents 

2014 revised 5 times





SOCIAL HISTORY: Lives alone. Nonsmoker. No alcohol use. Neurosurgeon: Dr. Garcia. 





PHYSICAL EXAM:


General:Patient is alert, in no acute distress. Sitting in a dark room. 


ENT:Eyes are normal to inspection.  ENT inspection normal.


Neck: Normal inspection.  Full range of motion.


Respiratory:No respiratory distress.  Breath sounds normal bilaterally.


Cardiovascular: Regular rate and rhythm.  Strong peripheral pulses.  Normal cap 

refill.


Abdomen:The abdomen is nontender to palpation. There are no peritoneal signs. 


Back: Normal to inspection.  No tenderness to palpation.


Skin: Normal color.  No rash.  Warm and dry.


Extremities: Normal appearance.  Full range of motion.


Neuro: Oriented x3.  Normal motor function.  Normal sensory function.


ED Course: 


This is a 57 y/o female with a history of chronic pain who presents with a 2-

day history of lower back pain radiating around to her left groin. Exam is 

unremarkable. No evidence of acute cauda equina syndrome. Plan for IV, labs, UA

, symptomatic management. 1L IV NS and 1mg IV Dilaudid ordered. 





Labs unremarkable. 





Patient continues to complain of pain. Additional 1mg IV Dilaudid ordered. Due 

to history of aortoiliac stents and vascular disease, I've ordered an abdominal 

CT for further evaluation. 





The patient states she has a contrast allergy only to Optiray 160 and does not 

need pretreatment for any other type of contrast. She also reports she took 

Benadryl at home this morning in case she needed a scan. 





Abdominal CT shows constipation, otherwise negative for acute findings.





Reassessed patient and discussed findings. She continues to complain of severe 

pain. 1mg IV Dilaudid ordered. 





Spoke with hospitalist service. Dr. Marcelo accepts admission. 





- Data Points


Imaging Results: 


 Imaging Impressions





Abdomen CT  12/30/18 12:18


Impression: 


1. No localized intraabdominal inflammatory process.


2. Constipation. No obstruction or appendicitis.


3. Normal caliber atherosclerotic aorta. 


 


Findings discussed with emergency department physician, Jaswant Mark MD 

on December 30, 2018 at 1:23 p.m.











Imaging: Discussed imaging studies w/ On call Radiologist, I viewed and 

interpreted images myself


Laboratory Results: 


 Laboratory Results





 12/30/18 11:28 





 12/30/18 11:28 





 











  12/30/18 12/30/18





  11:28 11:28


 


WBC    6.69 10^3/uL 10^3/uL





    (3.80-9.50) 


 


RBC    4.62 10^6/uL 10^6/uL





    (4.18-5.33) 


 


Hgb    14.9 g/dL g/dL





    (12.6-16.3) 


 


Hct    43.6 % %





    (38.0-47.0) 


 


MCV    94.4 fL fL





    (81.5-99.8) 


 


MCH    32.3 pg pg





    (27.9-34.1) 


 


MCHC    34.2 g/dL g/dL





    (32.4-36.7) 


 


RDW    11.7 % %





    (11.5-15.2) 


 


Plt Count    206 10^3/uL 10^3/uL





    (150-400) 


 


MPV    9.7 fL fL





    (8.7-11.7) 


 


Neut % (Auto)    68.7 % %





    (39.3-74.2) 


 


Lymph % (Auto)    22.6 % %





    (15.0-45.0) 


 


Mono % (Auto)    7.0 % %





    (4.5-13.0) 


 


Eos % (Auto)    0.4 % L %





    (0.6-7.6) 


 


Baso % (Auto)    0.7 % %





    (0.3-1.7) 


 


Nucleat RBC Rel Count    0.0 % %





    (0.0-0.2) 


 


Absolute Neuts (auto)    4.59 10^3/uL 10^3/uL





    (1.70-6.50) 


 


Absolute Lymphs (auto)    1.51 10^3/uL 10^3/uL





    (1.00-3.00) 


 


Absolute Monos (auto)    0.47 10^3/uL 10^3/uL





    (0.30-0.80) 


 


Absolute Eos (auto)    0.03 10^3/uL 10^3/uL





    (0.03-0.40) 


 


Absolute Basos (auto)    0.05 10^3/uL 10^3/uL





    (0.02-0.10) 


 


Absolute Nucleated RBC    0.00 10^3/uL 10^3/uL





    (0-0.01) 


 


Immature Gran %    0.6 % %





    (0.0-1.1) 


 


Immature Gran #    0.04 10^3/uL 10^3/uL





    (0.00-0.10) 


 


Sodium  137 mEq/L mEq/L  





   (135-145)  


 


Potassium  4.1 mEq/L mEq/L  





   (3.5-5.2)  


 


Chloride  106 mEq/L mEq/L  





   ()  


 


Carbon Dioxide  23 mEq/l mEq/l  





   (22-31)  


 


Anion Gap  8 mEq/L mEq/L  





   (6-14)  


 


BUN  15 mg/dL mg/dL  





   (7-23)  


 


Creatinine  0.7 mg/dL mg/dL  





   (0.6-1.0)  


 


Estimated GFR  > 60   





   


 


Glucose  93 mg/dL mg/dL  





   ()  


 


Calcium  9.4 mg/dL mg/dL  





   (8.5-10.4)  











Medications Given: 


 








Discontinued Medications





Diphenhydramine HCl (Benadryl)  50 mg PO EDNOW ONE


   Stop: 12/30/18 13:27


   Last Admin: 12/30/18 13:46 Dose:  50 mg


Hydromorphone HCl (Dilaudid)  1 mg IVP EDNOW ONE


   Stop: 12/30/18 11:13


   Last Admin: 12/30/18 11:34 Dose:  1 mg


Hydromorphone HCl (Dilaudid)  1 mg IVP EDNOW ONE


   Stop: 12/30/18 12:19


   Last Admin: 12/30/18 12:23 Dose:  1 mg


Hydromorphone HCl (Dilaudid)  1 mg IVP EDNOW ONE


   Stop: 12/30/18 13:54


   Last Admin: 12/30/18 14:04 Dose:  1 mg


Sodium Chloride (Ns)  1,000 mls @ 0 mls/hr IV EDNOW ONE; Wide Open


   PRN Reason: Protocol


   Stop: 12/30/18 11:13


   Last Admin: 12/30/18 11:42 Dose:  1,000 mls








General


Time Seen by Provider: 12/30/18 10:55


Initial Vital Signs: 


 Initial Vital Signs











Temperature (C)  36.5 C   12/30/18 10:38


 


Heart Rate  96   12/30/18 10:38


 


Respiratory Rate  18   12/30/18 10:38


 


Blood Pressure  141/88 H  12/30/18 10:38


 


O2 Sat (%)  96   12/30/18 10:38








 











O2 Delivery Mode               Room Air














Allergies/Adverse Reactions: 


 





Penicillins Allergy (Severe, Verified 12/30/18 10:36)


 ANAPHYLACTIC


meperidine HCl [From Demerol] Allergy (Intermediate, Verified 12/30/18 10:36)


 ANXIOUS


prednisone [Prednisone] Allergy (Intermediate, Verified 12/30/18 14:20)


 Other-Enter Comments


Sulfa (Sulfonamide Antibiotics) Allergy (Intermediate, Verified 12/30/18 10:36)


 NAUSEA


clopidogrel bisulfate [From Plavix] Allergy (Verified 12/30/18 14:20)


 Rash


ioversol [From Optiray 160] Allergy (Verified 12/30/18 10:36)


 


Quinolones Allergy (Verified 12/30/18 14:20)


 Other-Enter Comments








Home Medications: 














 Medication  Instructions  Recorded


 


Diclofenac Sodium 1% [Voltaren Gel 4 gm TP QID PRN 08/26/18





(*)]  


 


Pantoprazole Sodium [Protonix 40mg 40 mg PO BIDMEAL 08/26/18





(*)]  


 


morphINE SR [MS Contin/Oramorph 60 60 mg PO BID@06,18 08/26/18





mg (*)]  


 


oxyCODONE IR [Oxycodone Ir (*)] 10 mg PO HS PRN 08/26/18


 


oxyCODONE IR [Oxycodone Ir (*)] 10 mg PO QID@06,10,14,18 08/26/18


 


Aspirin [Aspirin 81mg (*)] 81 mg PO Q2D 08/29/18


 


Methocarbamol [Robaxin 750 mg (*)] 750 mg PO TID PRN #60 tab 08/29/18


 


Doxepin HCl [Silenor] 3 mg PO HS 12/30/18


 


Fexofenadine HCl [Allegra Allergy] 180 mg PO DAILY 12/30/18


 


Gabapentin [Neurontin 300 MG (*)] 300 mg PO HS 12/30/18














Departure





- Departure


Disposition: [facility] Inpatient Acute


Clinical Impression: 


 Neck pain





Back pain


Qualifiers:


 Back pain location: low back pain Chronicity: unspecified Back pain laterality

: left Sciatica presence: without sciatica Qualified Code(s): M54.5 - Low back 

pain





Condition: Fair


Report Scribed for: Jaswant Mark


Report Scribed by: Rona Grider


Date of Report: 12/30/18


Time of Report: 11:18


Physician Review and Approval Statement: Portions of this note were transcribed 

by an ED scribe.  I personally performed the history, physical exam, and 

medical decision making; and confirm the accuracy of the information in the 

transcribed note.

## 2018-12-31 NOTE — GCON
INPATIENT CONSULTATION



REFERRING PHYSICIAN:  Yrn Garcia MD



ADDITIONAL DICTATED FOR:  Yrn Garcia MD



CHIEF COMPLAINT:  Left lower back pain, left flank pain.



HISTORY OF PRESENT ILLNESS:  Patient is a 58-year-old female who has been seen 
by Dr. 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  Patient has a 
history of a C5-6 fusion in 1999 with an outside surgeon and had been getting 
effective injections at the right C4-5 at [facility].  Patient presented to 
the emergency room yesterday with terrible left lower back spasms that wrap 
around into her flank region.  Patient denies any radicular symptoms at this 
time.  She denies any lower extremity weakness, denies any incontinence of her 
bowel or bladder.  Patient denies any trauma.  She does note that she tripped 
going up some stairs a few days ago, but did not actually fall.  Patient takes 
pain medications chronically, including long-acting morphine and oxycodone.



REVIEW OF SYSTEMS:  A 10-point review of systems was performed and negative 
aside from what is mentioned in the HPI.



PAST MEDICAL HISTORY:  

1.  Chronic pain syndrome.

2.  Degenerative disk disease, cervical spine.

3.  Peripheral vascular disease.

4.  Coronary artery disease.

5.  Aortic insufficiency.



PAST SURGICAL HISTORY:  Patient had a C5-6 anterior cervical diskectomy and 
fusion in 1999 and a posterior C5-6 fusion in 2001 with wires.  Patient also 
has history of lumbar surgery.



ALLERGIES:  Patient is allergic to penicillin, meperidine HCL, prednisone and 
other steroids except Kenalog.



FAMILY HISTORY:  Reviewed and noncontributory to present situation.



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  Patient 
is a nonsmoker.  She denies any alcohol use.



MEDICATIONS:  Include albuterol 1-2 puffs q.4 hours p.r.n., Voltaren gel 4 g TP 
q.i.d. p.r.n., Advair 250/50 one puff b.i.d. p.r.n., Protonix 40 mg p.o. b.i.d.
, MS Contin 60 mg b.i.d., oxycodone 10 mg q.h.s. p.r.n., oxycodone 10 mg p.o. 
q.i.d., aspirin 81 mg daily, methocarbamol 750 mg p.o. t.i.d.



LABORATORY RESULTS:  White blood cell count 6.69, hemoglobin 14.9, hematocrit 
43.6, platelet count 206.  PT is 12.3, INR 0.89, APTT 24.8.  Sodium 137, 
potassium 4.1, BUN is 15, creatinine 0.7, glucose is 93.



DIAGNOSTIC IMAGING:  CT of the abdomen, performed with contrast on 12/30/2018, 
demonstrates no intra-abdominal inflammatory process; constipation; no 
obstruction or appendicitis.  Of note, severe degenerative disk disease is seen 
at L1-2 and L5-S1.



PHYSICAL EXAMINATION:  VITAL SIGNS:  Blood pressure is 139/67, heart rate 61, 
respiratory rate 14, oxygen saturations 93% on room air, temperature is 36.8 
degrees Celsius.  HEENT:  Head is normocephalic, atraumatic.  Pupils are equal, 
round, and reactive to light.  EOM is intact.  Full visual fields by 
confrontation.  GENITOURINARY AND RECTAL:  Deferred.  RESPIRATORY, CARDIAC, 
ABDOMINAL:  Deferred.  NEUROLOGIC:  The patient is awake and alert, oriented to 
name, place, location, date, time, and situation.  Her memory is intact to 
immediate past and current events.  Speech:  No aphasia or dysphonia.  Cranial 
nerves 2-12 are grossly intact.  Motor:  Patient has 5/5 strength in all muscle 
groups in bilateral upper and lower extremities to include deltoids, biceps, 
triceps, brachioradialis, wrist flexion, extensors, , intrinsic fingers, 
iliopsoas, quadriceps, hamstring, plantar flexion, dorsiflexion, EHL testing.  
Sensation is grossly intact to light touch throughout all dermatomal 
distributions of bilateral lower extremities.  Patient has a positive straight 
leg raise on the left.  Negative LAWSON, although exam is somewhat limited to 
pain in the left lower extremity.  Reflexes:  Biceps, triceps, brachioradialis, 
knee jerk and ankle jerk are 2+ out of 4.  Claire was negative.  Babinski was 
negative.



ASSESSMENT AND PLAN:  Patient is a 58-year-old female with a history of chronic 
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  Patient presented with 
terrible left lower back pain, which she described as spasms, that radiate into 
the flank region.  CT of the abdomen did not show any evidence of a kidney stone
, although there was some severe degenerative disk disease seen in the 
periphery at L1-2 and L5-S1.  We will get an MRI of the lumbar spine without 
contrast for further evaluation; we will also order an MRI of the left hip. The 
patients exam was somewhat limited due to pain.  Patient may consider another 
injection at the right C4-5 level, which was previously performed with [facility].  Patient was seen and examined by Neurosurgical 
Services at the bedside at 1340 on December 31, 2018.  Please contact 
Neurosurgery with any questions or concerns.





Job #:  [id number]

MTDD

## 2018-12-31 NOTE — PDMN
Medical Necessity


Medical necessity: Pt meets IP criteria as of 12/31/2018 per MD and MCG M-63 (

Back pain); los > 2 mn for ongoing tx and management of intractable back and 

abdominal pain that is not responding to OP pain management; requiring PO and 

IV analgesics and muscle relaxers, neurology consultation and further workup.

## 2018-12-31 NOTE — NEUSURGPN
Assessment/Plan: 


Assessment: 58 yr old F admitted to Hospitalist for left lower back pain, left 

flank pain. 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





Please see full dictated consult when available





Plan:


-MRI lumbar without ordered


-Patient has known stenosis at C4-5, previous cervical surgery in 1999 at C5-6. 

Right sided cervical symptoms had been treated effectively with injections in 

the past 


-Will await MRI results, possibly consider injection treatment for cervical and 

lumbar if indicated 


Please call neurosurgery with questions/concerns 





Subjective: 


left back and flank pain, denies weakness in extremities 


Objective: 


AxO x4


PERRLA


EOMI


CN 2-12 grossly intact 


5/5 BUE, BLE


Sensation intact to light touch BLE





Neuro Check Frequency: per routine 


Urinary Catheter in Place: No





- Physician


Discussed Patient with : Radha





Neurosurgery Physical Exam





- Vitals, I&O, Labs





 I and O











 12/30/18 12/31/18 01/01/19





 05:59 05:59 05:59


 


Intake Total  400 


 


Output Total  500 


 


Balance  -100 


 


Weight  75.75 kg 


 


Intake:   


 


  Oral (ml)  400 


 


Output:   


 


  Urine (ml)  500 


 


    Bedside Commode  500 


 


Other:   


 


  Intake Quantity  Yes 





  Sufficient   


 


  Number of Voids   


 


    Bedside Commode  2 








 Vital Signs











Temp Pulse Resp BP Pulse Ox


 


 36.8 C   61   14   139/67 H  93 


 


 12/31/18 11:24  12/31/18 11:24  12/31/18 11:24  12/31/18 11:24  12/31/18 11:24














ICD10 Worksheet


Patient Problems: 


 Problems











Problem Status Onset


 


Back pain Acute  


 


Neck pain Acute  


 


Dizziness Acute  


 


Headache Acute

## 2018-12-31 NOTE — HOSPPROG
Hospitalist Progress Note


Assessment/Plan: 





58y female with c/o acute on chronic back pain. First encounter, chart 

reviewed. D/W Neurosurgery.





#Acute left mid back and flank pain


- appears to be muscular in origin and probably related to her fall at home


- consult nsg


- possible MRI


- Heat and cold as needed


- Increased her dose of Robaxin


- Schedule doses of Toradol


- Continue her usual home narcotics





#Severe spasm


- Markedly impaired mobility due to her pain and spasm as above


- PT/OT





#Fall at home


- with injury as above


- mechanical fall having tripped





#Acute exacerbation of chronic muscular headache syndrome


-gets injections in the past


-NSG seeing





#Chronic pain syndrome on chronic moderately high dose narcotics at home


- this will make managing her current acute pain syndrome more difficult


- cont home meds





#Chronic cervical spine pain issues


- ongoing care with Dr. Bowen of Neurosurgery


- no apparent acute injury or problems with the cervical spine other than flare-

up of her usual headache





#Known history of peripheral vascular disease with iliac artery stents


-stable





#Dispo


-unclear


-change to inpt status








Subjective: Angry, frustrated. C/O back pain.


Objective: 


 Vital Signs











Temp Pulse Resp BP Pulse Ox


 


 36.8 C   61   14   139/67 H  93 


 


 12/31/18 11:24  12/31/18 11:24  12/31/18 11:24  12/31/18 11:24  12/31/18 11:24








 











 12/30/18 12/31/18 01/01/19





 05:59 05:59 05:59


 


Intake Total  400 


 


Output Total  500 


 


Balance  -100 








 











PT  12.3 SEC (12.0-15.0)   12/30/18  15:39    


 


INR  0.89  (0.83-1.16)   12/30/18  15:39    














- Physical Exam


Constitutional: appears nourished, chronically ill appearing, uncomfortable


Eyes: PERRL, anicteric sclera, EOMI


Ears, Nose, Mouth, Throat: moist mucous membranes, hearing normal, ears appear 

normal


Cardiovascular: No JVD, No tachycardia, No edema


Respiratory: no respiratory distress, no rales or rhonchi, reduced air movement


Gastrointestinal: normoactive bowel sounds, No tenderness, No ascites


Skin: warm, normal color, No mottled


Musculoskeletal: no joint effusions, pain with ROM, generalized weakness


Neurologic: AAOx3


Psychiatric: not encephalopathic, thought process linear, anxious, agitated





ICD10 Worksheet


Patient Problems: 


 Problems











Problem Status Onset


 


Headache Acute  


 


Dizziness Acute  


 


Back pain Acute  


 


Neck pain Acute

## 2019-01-01 NOTE — HOSPPROG
Hospitalist Progress Note


Assessment/Plan: 





58y female with c/o acute on chronic back pain now with likely left paraspinal 

muscle strain. D/W Neurosurgery.





#Acute left mid back and flank pain


-mildly progressed spondylosis of the lumbar spine on  MRI 


-foraminal stenosis most significant at L45/L5S1


-No need for acute NS intervention.  


-recommend NSAIds, muscle relaxers, PT, and time


-repeat R C45 JUAN CARLOS for neck/arm pain outpatient.  


- appears to be muscular in origin and probably related to her fall at home


- Heat and cold as needed


- Increased her dose of Robaxin


- Schedule doses of Toradol


- Continue her usual home narcotics


-add lido pathc and po dilaudid





#Severe spasm


- better


- PT/OT





#Fall at home


- with injury as above


- mechanical fall having tripped





#Acute exacerbation of chronic muscular headache syndrome


-gets injections in the past


-better





#Chronic pain syndrome on chronic moderately high dose narcotics at home


- this will make managing her current acute pain syndrome more difficult


- cont home meds





#Chronic cervical spine pain issues


- ongoing care with Dr. Bowen of Neurosurgery


- no apparent acute injury or problems with the cervical spine other than flare-

up of her usual headache





#Known history of peripheral vascular disease with iliac artery stents


-stable





#Dispo


-unclear


-change to inpt status


-needs SNF rehab








Subjective: Still having pain. Not feeling better.


Objective: 


 Vital Signs











Temp Pulse Resp BP Pulse Ox


 


 36.5 C   71   18   126/73 H  94 


 


 01/01/19 07:46  01/01/19 07:46  01/01/19 07:46  01/01/19 07:46  01/01/19 07:46








 











 12/31/18 01/01/19 01/02/19





 05:59 05:59 05:59


 


Intake Total  1150 


 


Output Total  2000 


 


Balance  -850 








 











PT  12.3 SEC (12.0-15.0)   12/30/18  15:39    


 


INR  0.89  (0.83-1.16)   12/30/18  15:39    














- Physical Exam


Constitutional: appears nourished, chronically ill appearing, uncomfortable


Eyes: PERRL, anicteric sclera, EOMI


Ears, Nose, Mouth, Throat: moist mucous membranes, hearing normal, ears appear 

normal


Cardiovascular: regular rate and rhythym, No JVD, No edema


Respiratory: no respiratory distress, no rales or rhonchi, reduced air movement


Gastrointestinal: normoactive bowel sounds, No tenderness, No ascites


Skin: warm, normal color, No mottled


Musculoskeletal: no joint effusions, pain with ROM, generalized weakness


Neurologic: AAOx3


Psychiatric: interacting appropriately, not anxious, not encephalopathic





ICD10 Worksheet


Patient Problems: 


 Problems











Problem Status Onset


 


Back pain Acute  


 


Neck pain Acute  


 


Dizziness Acute  


 


Headache Acute

## 2019-01-01 NOTE — ASMTCMCOM
CM Note

 

CM Note                       

Notes:

OT rec SNF, PT rec home. Spoke with pt about SNF, reports she will need to go to SNF because due to 


the pain she "has no choice." Pt provided SNF list and referrals sent in [employer]. Pt may choose 

[facility]. CM to follow. 

 

Date Signed:  01/01/2019 01:07 PM

Electronically Signed By:SEA Hart

## 2019-01-01 NOTE — NEUSURGPN
Assessment/Plan: 


58F chronic pain, with likely left paraspinal muscle strain.  She has stable to 

mildly progressed spondylosis of the lumbar spine on repeat MRI with foraminal 

stenosis most significant at L45/L5S1, largely stable on MR compared to 2009.  

She also has some edema in the left paraspinals noted on STIR noted by 

radiologist, could be indicative of a muscle strain.  SIJ provocative tests are 

negative, Hip MRI also w/o acute findings. 





She is also with neck pain and right sided symptoms thought to be related to 

some ASD in her cervical spine 





-No need for acute NS intervention.  


-recommend NSAIds, muscle relaxers, PT, and time.  erick Seay


-could consider L5S1 JUAN CARLOS or caudal, but not certain there would much benefit.


-could repeat R C45 JUAN CARLOS for neck/arm pain if remains severe, otherwise, we will 

follow this outpatient.  





Erick Garcia











Subjective: 


no weakness.  continues with left flank pain that is hard to describe but 

radiates into the groin, and posterior and lateral leg above the knee, and 

hurts when she tries to sit upright or lift her legs.  Secondarily, she also 

complains of neck pain, right face pain and pain going down her shoulder and 

the back of her arm.   


Objective: 


NAD


AAOx4


EOMI, PEARLA


Speech clear


MAEX4, 5/5, some pain limited in LLE>RLE


SILT





- Physician


Discussed Patient with : Rdaha





Neurosurgery Physical Exam





- Vitals, I&O, Labs





 I and O











 12/31/18 01/01/19 01/02/19





 05:59 05:59 05:59


 


Intake Total  1150 


 


Output Total  2000 


 


Balance  -850 


 


Intake:   


 


  Oral (ml)  1150 


 


Output:   


 


  Urine (ml)  2000 


 


    Bedside Commode  2000 


 


Other:   


 


  Intake Quantity  Yes 





  Sufficient   


 


  Number of Voids   


 


    Bedside Commode  4 








 Vital Signs











Temp Pulse Resp BP Pulse Ox


 


 36.5 C   71   18   126/73 H  94 


 


 01/01/19 07:46  01/01/19 07:46  01/01/19 07:46  01/01/19 07:46  01/01/19 07:46














ICD10 Worksheet


Patient Problems: 


 Problems











Problem Status Onset


 


Back pain Acute  


 


Neck pain Acute  


 


Dizziness Acute  


 


Headache Acute

## 2019-01-02 NOTE — HOSPPROG
Hospitalist Progress Note


Assessment/Plan: 





58y female with c/o acute on chronic back pain now with likely left paraspinal 

muscle strain. D/W Neurosurgery.





#Acute left mid back and flank pain


-mildly progressed spondylosis of the lumbar spine on MRI 


-foraminal stenosis most significant at L45/L5S1


-NSAIds, muscle relaxers, PT not helping


-repeat R C45 JUAN CARLOS for neck/arm pain outpatient.  


-appears to be muscular in origin and probably related to her fall at home


-Left L5 TFESI per neurosurgery


- Heat and cold as needed


- Robaxin


- Schedule doses of Toradol


- Continue her usual home narcotics


- lido patch and po dilaudid





#Severe spasm


- conts


- PT/OT





#Fall at home


- with injury as above


- mechanical fall having tripped





#Acute exacerbation of chronic muscular headache syndrome


-gets injections in the past


-better





#Chronic pain syndrome on chronic moderately high dose narcotics at home


- this will make managing her current acute pain syndrome more difficult


- cont home meds





#Chronic cervical spine pain issues


- ongoing care with Dr. Bowen of Neurosurgery


- no apparent acute injury or problems with the cervical spine other than flare-

up of her usual headache





#Known history of peripheral vascular disease with iliac artery stents


-stable





#Dispo


-unclear


-change to inpt status


-needs SNF rehab


-possible injection








Subjective: Still having severe pain. Unable to move with out significant 

increase.


Objective: 


 Vital Signs











Temp Pulse Resp BP Pulse Ox


 


 36.4 C   70   17   123/58 H  93 


 


 01/02/19 08:00  01/02/19 08:00  01/02/19 08:00  01/02/19 08:00  01/02/19 08:00








 











 01/01/19 01/02/19 01/03/19





 05:59 05:59 05:59


 


Intake Total 1150 800 900


 


Output Total 2000 4200 


 


Balance -850 -3400 900








 











PT  12.3 SEC (12.0-15.0)   12/30/18  15:39    


 


INR  0.89  (0.83-1.16)   12/30/18  15:39    














- Physical Exam


Constitutional: appears nourished, chronically ill appearing, uncomfortable


Eyes: PERRL, anicteric sclera, EOMI


Ears, Nose, Mouth, Throat: moist mucous membranes, hearing normal, ears appear 

normal


Cardiovascular: regular rate and rhythym, No JVD, No edema


Respiratory: no respiratory distress, no rales or rhonchi, reduced air movement


Gastrointestinal: normoactive bowel sounds, tenderness, No ascites


Skin: warm, normal color, No mottled


Musculoskeletal: joint tenderness, pain with ROM, generalized weakness


Neurologic: AAOx3


Psychiatric: not anxious, not encephalopathic, thought process linear





ICD10 Worksheet


Patient Problems: 


 Problems











Problem Status Onset


 


Headache Acute  


 


Dizziness Acute  


 


Back pain Acute  


 


Neck pain Acute

## 2019-01-02 NOTE — SOAPPROG
SOAP Progress Note


Assessment/Plan: 








Assessment/Plan: 


58F chronic pain. Complaining of mainly left buttock and groin pain today.


She has stable to mildly progressed spondylosis of the lumbar spine on repeat 

MRI with foraminal stenosis most significant at L45/L5S1, largely stable on MR 

compared to 2009.  She also has some edema in the left paraspinals noted on 

STIR noted by radiologist, could be indicative of a muscle strain.  SIJ 

provocative tests are negative, Hip MRI also w/o acute findings. 





She is also with neck pain and right sided symptoms thought to be related to 

some ASD in her cervical spine 





-Today Dr. Garcia reviewed her Lumbar MRI and she has left sided foraminal disc 

herniation that may be causing her current pain.  


-We will order left L5/S1 TFESI


-Continue Neurontin


-could repeat R C45 JUAN CARLOS for neck/arm pain if remains severe, otherwise, we will 

follow this outpatient.  





Dw Dr. Garcia











Subjective: 


Sitting up in bed complaining of left buttock pain that radiates to the left 

groin with any movement, particularly rotation. States her left hip and lateral 

leg feels "numb."  














Objective: 





 Vital Signs











Temp Pulse Resp BP Pulse Ox


 


 36.4 C   70   17   123/58 H  93 


 


 01/02/19 08:00  01/02/19 08:00  01/02/19 08:00  01/02/19 08:00  01/02/19 08:00








 











 01/01/19 01/02/19 01/03/19





 05:59 05:59 05:59


 


Intake Total 1150 800 900


 


Output Total 2000 4200 


 


Balance -850 -3400 900








 











PT  12.3 SEC (12.0-15.0)   12/30/18  15:39    


 


INR  0.89  (0.83-1.16)   12/30/18  15:39    








Neuro:


+Left Straight leg raise


5-/5 left DF/PF limited by pain


sens +LT





ICD10 Worksheet


Patient Problems: 


 Problems











Problem Status Onset


 


Back pain Acute  


 


Neck pain Acute  


 


Dizziness Acute  


 


Headache Acute

## 2019-01-03 NOTE — NEUSURGPN
Assessment/Plan: 


58F chronic pain. Complaining of mainly left buttock and groin pain today.


She has stable to mildly progressed spondylosis of the lumbar spine on repeat 

MRI with foraminal stenosis most significant at L45/L5S1, largely stable on MR 

compared to 2009.  She also has some edema in the left paraspinals noted on 

STIR noted by radiologist, could be indicative of a muscle strain.  SIJ 

provocative tests are negative, Hip MRI also w/o acute findings. 





She is also with neck pain and right sided symptoms thought to be related to 

some ASD in her cervical spine 





Plan:


-Dr. Garcia reviewed her Lumbar MRI and she has left sided foraminal disc 

herniation that may be causing her current pain 


-Patient is s/p left L5/S1 TFESI on 1/2, patient reports some improvement in 

her left hip/groin pain following injection.  We will continue to monitor pain 

level post injection. 


-Continue Neurontin


-could repeat right C4-5 JUAN CARLOS for neck/arm pain if remains severe, otherwise, we 

will follow this outpatient.  Patient plans to schedule cervical surgery as 

outpatient.  


-Discussed patient with Dr Garcia


Please call neurosurgery with questions/concerns 





Subjective: 


some improvement with left hip/groin pain post injection, hopeful it will 

continue to get better





Objective: 


AxO x4


LEMUS x4


5/5 BUE, BLE


Sensation intact to light touch to BLE


negative Andres bilaterally 


Neuro Check Frequency: per routine 


Urinary Catheter in Place: No





- Physician


Discussed Patient with Dr.: Garcia





Neurosurgery Physical Exam





- Vitals, I&O, Labs





 I and O











 01/02/19 01/03/19 01/04/19





 05:59 05:59 05:59


 


Intake Total 800 3400 


 


Output Total 4200 6200 


 


Balance -3400 -2800 


 


Intake:   


 


  Oral (ml) 800 3400 


 


Output:   


 


  Urine (ml) 4200 6200 


 


    Bedside Commode 4200 6200 


 


Other:   


 


  Intake Quantity Yes  





  Sufficient   


 


  Number of Voids   


 


    Bedside Commode 2 5 


 


  Number of Stools   


 


    Bedside Commode  1 








 Vital Signs











Temp Pulse Resp BP Pulse Ox


 


 36.9 C   66   16   136/81 H  94 


 


 01/03/19 00:00  01/03/19 00:00  01/03/19 00:00  01/03/19 00:00  01/03/19 00:00














ICD10 Worksheet


Patient Problems: 


 Problems











Problem Status Onset


 


Back pain Acute  


 


Neck pain Acute  


 


Dizziness Acute  


 


Headache Acute

## 2019-01-03 NOTE — HOSPPROG
Hospitalist Progress Note


Assessment/Plan: 








Meera Pelaez  is a 57 y/o female w chronic pain and now w acute pain. C/o left 

buttock and groin pain today.  She has stable to mildly progressed spondylosis 

of the lumbar spine on repeat MRI with foraminal stenosis most significant at 

L45/L5S1, largely stable on MR compared to 2009.  She also has some edema in 

the left paraspinals noted on STIR noted by radiologist, could be indicative of 

a muscle strain.  SIJ provocative tests are negative, Hip MRI also w/o acute 

findings. First encounter, chart reviewed.








#Acute left mid back and flank pain


-Patient is s/p left L5/S1 TFESI on 1/2, patient reports some improvement in 

her left hip/groin pain following injection.  


-repeat R C45 JUAN CARLOS for neck/arm pain outpatient.  


- Robaxin


- Schedule doses of Toradol


-nerve pain is her main complaint- oral Dilaudid is not helping, she is 

allergic to lyrica, cymbalta; spoke w pharmacy and they will see her today





#Severe spasm


- PT/OT





#Fall at home


- with injury as above


- mechanical fall having tripped





#Acute exacerbation of chronic muscular headache syndrome


-no c/o of this today





#chronic pain on continuous, chronic opioids


-she has a pan doctor she sees in the OP setting


-encouraged her to try other modalities for pain such as water therapy, Rolfing





#Chronic cervical spine pain issues


- ongoing care with Dr. Bowen of Neurosurgery


- no apparent acute injury or problems with the cervical spine other than flare-

up of her usual headache


- she plans on having cervical spine surgery





#Known history of peripheral vascular disease with iliac artery stents


-stable





#plan: PT and OT working w her, pharmacy to see her about, she will go to 

[facility] rehab for strengthening.  





Subjective: Nickie said her pain is wrapping around left hip going down her leg 

on the back sied.


Objective: 


 Vital Signs











Temp Pulse Resp BP Pulse Ox


 


 36.7 C   74   16   124/74 H  94 


 


 01/03/19 08:00  01/03/19 08:00  01/03/19 08:00  01/03/19 08:00  01/03/19 08:00








 











 01/02/19 01/03/19 01/04/19





 05:59 05:59 05:59


 


Intake Total 800 3400 


 


Output Total 4200 6200 300


 


Balance -3400 -2800 -300








 











PT  12.3 SEC (12.0-15.0)   12/30/18  15:39    


 


INR  0.89  (0.83-1.16)   12/30/18  15:39    














- Physical Exam


Constitutional: uncomfortable


Eyes: PERRL


Ears, Nose, Mouth, Throat: hearing normal


Cardiovascular: regular rate and rhythym


Respiratory: no respiratory distress


Skin: warm


Neurologic: AAOx3


Psychiatric: interacting appropriately





ICD10 Worksheet


Patient Problems: 


 Problems











Problem Status Onset


 


Back pain Acute  


 


Neck pain Acute  


 


Dizziness Acute  


 


Headache Acute

## 2019-01-04 NOTE — HOSPPROG
Hospitalist Progress Note


Assessment/Plan: 








Meera Pelaez  is a 59 y/o female w chronic pain and now w acute pain. C/o left 

buttock and groin pain.  She has stable to mildly progressed spondylosis of the 

lumbar spine on repeat MRI with foraminal stenosis most significant at L45/L5S1

, largely stable on MR compared to 2009.  She also has some edema in the left 

paraspinals noted on STIR noted by radiologist, could be indicative of a muscle 

strain.  SIJ provocative tests are negative, Hip MRI also w/o acute findings. 








#Acute left mid back and flank 


-s/p JUAN CARLOS without much improvement


-to get a caudal injection today 


-repeat R C45 JUAN CARLOS for neck/arm pain outpatient.  


- Robaxin, Ibuprofen





#Fall at home


- with injury as above


- mechanical fall having tripped





#Acute exacerbation of chronic muscular headache syndrome


-no c/o of this today





#chronic pain on continuous, chronic opioids


-she has a pian doctor she sees in the OP setting


-encouraged her to try other modalities for pain such as water therapy, Rolfing


-appreciated pharmacy getting involved with treating her pain





#Chronic cervical spine pain issues


- ongoing care with Dr. Bowen of Neurosurgery


- no apparent acute injury or problems with the cervical spine other than flare-

up of her usual headache


- she plans on having cervical spine surgery





#Known history of peripheral vascular disease with iliac artery stents


-stable





#plan: will plan to dc to [facility] rehab in the morning, she would like to go 

home, but lives alone.  She would benefit from rehab.  She fell at home and has 

stairs.


Subjective: Nickie is hoping the caudal injection helps.


Objective: 


 Vital Signs











Temp Pulse Resp BP Pulse Ox


 


 36.7 C   63   16   146/79 H  96 


 


 01/04/19 08:00  01/04/19 08:00  01/04/19 08:00  01/04/19 08:00  01/04/19 08:00








 











 01/03/19 01/04/19 01/05/19





 05:59 05:59 05:59


 


Intake Total 3400  


 


Output Total 6200 300 


 


Balance -2800 -300 








 











PT  12.3 SEC (12.0-15.0)   12/30/18  15:39    


 


INR  0.89  (0.83-1.16)   12/30/18  15:39    














- Physical Exam


Constitutional: appears nourished, uncomfortable


Eyes: PERRL


Ears, Nose, Mouth, Throat: hearing normal


Respiratory: no respiratory distress


Gastrointestinal: normoactive bowel sounds


Skin: warm


Musculoskeletal: generalized weakness


Neurologic: AAOx3


Psychiatric: interacting appropriately





ICD10 Worksheet


Patient Problems: 


 Problems











Problem Status Onset


 


Back pain Acute  


 


Neck pain Acute  


 


Dizziness Acute  


 


Headache Acute

## 2019-01-04 NOTE — ASMTCMCOM
CM Note

 

CM Note                       

Notes:

Pt chooses [facility] for d/c, likely tomorrow. Taisha at [facility] updated. CM to follow. 

 

Date Signed:  01/04/2019 02:17 PM

Electronically Signed By:SEA Hart

## 2019-01-04 NOTE — NEUSURGPN
Assessment/Plan: 





58F chronic pain. Complaining of mainly left groin, anterior hip pain today


She has stable to mildly progressed spondylosis of the lumbar spine on repeat 

MRI with foraminal stenosis most significant at L45/L5S1, largely stable on MR 

compared to 2009.  She also has some edema in the left paraspinals noted on 

STIR noted by radiologist, could be indicative of a muscle strain.  SIJ 

provocative tests are negative, Hip MRI also w/o acute findings. 











Plan:


-Caudal injection scheduled for today, patient is NPO


-Continue Neurontin


-could repeat right C4-5 JUAN CARLOS for neck/arm pain if remains severe, otherwise, we 

will follow this outpatient.  Patient plans to schedule cervical surgery as 

outpatient.  


-Discussed patient with Dr Garcia


Please call neurosurgery with questions/concerns 








Subjective: 


left anterior hip pain worse with movement/flexion of left hip


Objective: 


AxO x4


LEMUS x4


5/5 BUE, BLE, except pain limited motion with left HF 4+/5


Sensation intact to light touch to BLE








- Physician


Discussed Patient with Dr.: Garcia





Neurosurgery Physical Exam





- Vitals, I&O, Labs





 I and O











 01/03/19 01/04/19 01/05/19





 05:59 05:59 05:59


 


Intake Total 3400  


 


Output Total 6200 300 


 


Balance -2800 -300 


 


Intake:   


 


  Oral (ml) 3400  


 


Output:   


 


  Urine (ml) 6200 300 


 


    Bedside Commode 6200 300 


 


Other:   


 


  Intake Quantity  Yes 





  Sufficient   


 


  Number of Voids   


 


    Bedside Commode 5 1 


 


    Toilet  3 


 


  Number of Stools   


 


    Bedside Commode 1  








 Vital Signs











Temp Pulse Resp BP Pulse Ox


 


 37.0 C   63   16   114/54 L  93 


 


 01/03/19 23:32  01/03/19 23:32  01/03/19 23:32  01/03/19 23:32  01/03/19 23:32














ICD10 Worksheet


Patient Problems: 


 Problems











Problem Status Onset


 


Back pain Acute  


 


Neck pain Acute  


 


Dizziness Acute  


 


Headache Acute

## 2019-01-05 NOTE — NEUSURGPN
Assessment/Plan: 


Assessment: 57 yo female with a hx of chronic pain. Pt presented with mainly 

complaining of left groin, anterior hip pain.  She continues to complain of 

left inguinal pain that hurts with rotation of the left hip  





Plan:


-pt has a stable to mildly progressed spondylosis of the lumbar spine on repeat 

MRI with foraminal stenosis most significant at L45/L5S1, largely stable on MR 

compared to 2009.  She also has some edema in the left paraspinals noted on 

STIR noted by radiologist, could be indicative of a muscle strain.  SI joint 

provocative tests were negative.  Prior reports that the left hip MRI also w/o 

acute findings to the L spine c/w a strain 


-caudal injection done yesterday and she states that the back pain is better 

but the left inguinal pain continues


-pt is frustrated with the left hip pain that seems to be the main problem now


-will order upright weight bearing hip films


-consider ortho evaluation as well 


-continue Neurontin


-could repeat right C4-5 JUAN CARLOS for neck/arm pain if remains severe, otherwise, we 

will follow this outpatient.  Patient plans to schedule cervical surgery as 

outpatient  


-discussed patient with Dr Garcia


-please call neurosurgery with questions/concerns 


Subjective: 


Awake and alert.  NAD.  Eating/drinking and voiding.  No f/c/n/v/d.  No ha/neck/

chest/abd or gu complaints.  


Objective: 


AAO x 3, PERRLA/EOMI


no droop


CN 2-12 grossly intact


+lt touch


5/5 BUE/BLE =


pt with pain with internal/external rotation of left hip when c/w right


Neuro Check Frequency: per routine


Urinary Catheter in Place: No





- Physician


Discussed Patient with Dr.: Garcia





Neurosurgery Physical Exam





- Vitals, I&O, Labs





 I and O











 01/04/19 01/05/19 01/06/19





 05:59 05:59 05:59


 


Intake Total  1500 


 


Output Total 300  


 


Balance -300 1500 


 


Intake:   


 


  Oral (ml)  1500 


 


Output:   


 


  Urine (ml) 300  


 


    Bedside Commode 300  


 


Other:   


 


  Intake Quantity Yes  





  Sufficient   


 


  Number of Voids   


 


    Bedside Commode 1  


 


    Toilet 3 3 








 Vital Signs











Temp Pulse Resp BP Pulse Ox


 


 36.7 C   62   16   130/53 H  95 


 


 01/04/19 22:27  01/04/19 22:27  01/04/19 22:27  01/04/19 22:27  01/04/19 22:27














ICD10 Worksheet


Patient Problems: 


 Problems











Problem Status Onset


 


Back pain Acute  


 


Neck pain Acute  


 


Dizziness Acute  


 


Headache Acute

## 2019-01-05 NOTE — HOSPPROG
Hospitalist Progress Note


Assessment/Plan: 








Nickie Pelaez  is a 57 y/o female w chronic pain and now w acute pain. C/o left 

buttock and groin pain.  She has stable to mildly progressed spondylosis of the 

lumbar spine on repeat MRI with foraminal stenosis most significant at L45/L5S1

, largely stable on MR compared to 2009.  She also has some edema in the left 

paraspinals noted on STIR noted by radiologist, could be indicative of a muscle 

strain.  SIJ provocative tests are negative, Hip MRI also w/o acute findings. 

Reviewed her care w MICHELLE Thurman w neurosurgery, he ordered x rays of hips.








#Acute left mid back and flank 


-s/p JUAN CARLOS without much improvement


-relief w caudal injection yesterday, but continues to have groin pain


-I reviewed the x rays myself and didn't see anything acute


-she is c/o ongoing left groin pain that wraps around the outer hip area and 

down her leg


-spoke w Neurosurgery-no surgery indicated


-repeat R C45 JUAN CARLOS for neck/arm pain outpatient.  


- Robaxin, Ibuprofen





#Fall at home


- with injury as above


- mechanical fall having tripped





#Acute exacerbation of chronic muscular headache syndrome


-no c/o of this today





#chronic pain on continuous, chronic opioids


-she has a pian doctor she sees in the OP setting


-encouraged her to try other modalities for pain such as water therapy, Rolfing


-appreciated pharmacy getting involved with treating her pain





#Chronic cervical spine pain issues


- ongoing care with Dr. Bowen of Neurosurgery


- no apparent acute injury or problems with the cervical spine other than flare-

up of her usual headache


- she plans on having cervical spine surgery





#Known history of peripheral vascular disease with iliac artery stents


-stable





#plan: reviewed in detail her care w neurosurgery, having a difficult time 

trying to figure the true etiology of her pain. she is able to shower, do basic 

ADL's but says she can't cross her leg and sit for any length of time.  Will 

recheck labs in a.m., including a sed rate and c-reactive protein to be sure 

nothing acute is going on. Could consider orthopedics, but her pain seems r/t 

her back.


Subjective: Nickie said pain is ok while in bed, unable to sit in chair for long 

or cross her legs.


Objective: 


 Vital Signs











Temp Pulse Resp BP Pulse Ox


 


 36.7 C   67   14   132/72 H  96 


 


 01/05/19 08:00  01/05/19 08:00  01/05/19 08:00  01/05/19 08:00  01/05/19 08:00








 











 01/04/19 01/05/19 01/06/19





 05:59 05:59 05:59


 


Intake Total  1500 


 


Output Total 300  


 


Balance -300 1500 








 











PT  12.3 SEC (12.0-15.0)   12/30/18  15:39    


 


INR  0.89  (0.83-1.16)   12/30/18  15:39    














- Physical Exam


Constitutional: uncomfortable


Eyes: PERRL


Ears, Nose, Mouth, Throat: hearing normal


Cardiovascular: regular rate and rhythym


Respiratory: no respiratory distress


Skin: warm


Musculoskeletal: muscular tenderness


Neurologic: AAOx3


Psychiatric: interacting appropriately





ICD10 Worksheet


Patient Problems: 


 Problems











Problem Status Onset


 


Back pain Acute  


 


Neck pain Acute  


 


Dizziness Acute  


 


Headache Acute

## 2019-01-06 NOTE — GCON
ORTHOPEDIC EMERGENCY ROOM CONSULT



DATE OF CONSULTATION:  01/06/2019



CHIEF COMPLAINT:  Left groin pain.



ASSOCIATED DIAGNOSES:  

1.  Chronic pain.

2.  History of iliac stents.

3.  Chronic neck and back pain.



HISTORY OF PRESENT ILLNESS:  The patient is a 58-year-old female who I was asked for consultation on 
left groin pain.  Her chronic pain provider is Darling.  She admits taking oxycodone 5 mg every 3 to 4 
hours for her pain needs.  She is allergic to all steroid injections, except for Kenalog.  She has ha
d unremitting left groin pain for about a week.  Neurosurgery has seen her.  Recommended orthopedic e
valuation of her left hip.  X-rays and MRI have been performed.  MRI pelvis is pending. 



Please see details of ER H and P and admitting H and P.



PHYSICAL EXAMINATION:  GENERAL:  Pertinent orthopedic examination reveals a well-appearing female.  H
urried speech.  She is ambulatory.  She is eating.  EXTREMITIES:  She has a negative log roll of bila
teral hips.  She is quite tender over her greater trochanter on the left versus the right.  She is ab
le to bear weight, but she describes unremitting pain in a C shape sign distribution.  She has intact
 tibial and gastroc soleus that are 5/5, and ability to flex the hip to 100 degrees.



IMAGING:  X-rays show some mild dysplasia.  The joint spaces look fairly well preserved without any e
vidence of end-stage arthritis and no fracture. 



MRI dedicated to the left hip reveal no evidence of fracture.  No significant labral pathology.  She 
does have some edema around the greater trochanter bursa, which is mild.  No significant effusion ind
icating infection.



IMPRESSION/RECOMMENDATION:  Left groin pain.  History of chronic pain.  I think this does play a role
 in her pain.  She does have an element of mild greater trochanter bursitis, but I think her pain is 
out of proportion compared to the MRI findings.  The MRI pelvis is pending and we will take a look at
 that.  Otherwise, I would recommend outpatient followup hip evaluation.  Possible hip steroid inject
ion with Kenalog could be considered.  I worry about her steroid load on this hospital visit.





Job #:  [id number]

## 2019-01-06 NOTE — HOSPPROG
Hospitalist Progress Note


Assessment/Plan: 








Nickie Pelaez  is a 59 y/o female w chronic pain and now w acute pain. C/o left 

buttock and groin pain.  She has stable to mildly progressed spondylosis of the 

lumbar spine on repeat MRI with foraminal stenosis most significant at L45/L5S1

, largely stable on MR compared to 2009.  She also has some edema in the left 

paraspinals noted on STIR noted by radiologist, could be indicative of a muscle 

strain.  SIJ provocative tests are negative, Hip MRI also w/o acute findings. 





#Acute left mid back and flank 


-s/p JUAN CARLOS without much improvement


-relief w caudal injection but continues to have groin pain


-I reviewed the x rays myself and didn't see anything acute


-spoke w Neurosurgery-no surgery indicated


-repeat R C45 JUAN CARLOS for neck/arm pain outpatient.  


-Robaxin, Ibuprofen, narcotics


-c/o ongoing left groin, left hip pain-spoke w Dr Miranda and he will review her 

imaging





#Fall at home


- with injury as above


- mechanical fall having tripped





#hyponatremia


-follow





#Acute exacerbation of chronic muscular headache syndrome


-no c/o of this today





#chronic pain on continuous, chronic opioids


-she has a pian doctor she sees in the OP setting


-encouraged her to try other modalities for pain such as water therapy, Rolfing


-appreciated pharmacy getting involved with treating her pain





#Chronic cervical spine pain issues


- ongoing care with Dr. Bowen of Neurosurgery


- no apparent acute injury or problems with the cervical spine other than flare-

up of her usual headache


- she plans on having cervical spine surgery





#Known history of peripheral vascular disease with iliac artery stents


-stable


-has good pulses, no swelling





#plan: Nickie says pain is too severe to be discharged, she says she didn't get 

significant relief from the gabapentin but it allowed her to sleep.  Spoke w Dr Miranda and he will see her, he reviewed her MRI of the left hip-nothing acute 

noted. Will look at her MRI of the pelvis, thinking she may have a muscle tear 

and may need time to heal.


Appreciate Dr Miranda seeing Nickie! 


Subjective: Nickie said pain is too intense for her to be discharged.


Objective: 


 Vital Signs











Temp Pulse Resp BP Pulse Ox


 


 37.1 C   76   16   143/71 H  96 


 


 01/06/19 08:00  01/06/19 08:00  01/06/19 08:00  01/06/19 08:00  01/06/19 08:00








 Laboratory Results





 01/06/19 05:01 





 01/06/19 05:01 





 











 01/05/19 01/06/19 01/07/19





 05:59 05:59 05:59


 


Intake Total 1500 720 


 


Balance 1500 720 








 











PT  12.3 SEC (12.0-15.0)   12/30/18  15:39    


 


INR  0.89  (0.83-1.16)   12/30/18  15:39    














- Physical Exam


Constitutional: no apparent distress, appears nourished, uncomfortable


Eyes: PERRL


Respiratory: no respiratory distress


Skin: warm


Musculoskeletal: muscular tenderness (left groin)


Neurologic: AAOx3


Psychiatric: interacting appropriately





ICD10 Worksheet


Patient Problems: 


 Problems











Problem Status Onset


 


Back pain Acute  


 


Neck pain Acute  


 


Dizziness Acute  


 


Headache Acute

## 2019-01-06 NOTE — NEUSURGPN
Assessment/Plan: 


Assessment: 59 yo female with a hx of chronic pain. Pt presented with mainly 

complaining of left groin, anterior hip pain.  She continues to complain of 

left inguinal pain that hurts with rotation of the left hip  





Plan:


-pt has a stable to mildly progressed spondylosis of the lumbar spine on repeat 

MRI with foraminal stenosis most significant at L45/L5S1, largely stable on MR 

compared to 2009.  She also has some edema in the left paraspinals noted on 

STIR noted by radiologist, could be indicative of a muscle strain.  SI joint 

provocative tests were negative.  Prior reports that the left hip MRI also w/o 

acute findings to the L spine c/w a strain 


-caudal injection done 2 days ago and she states that the back pain is better.  

The left inguinal pain is better this am as well


-pt states that she took the gabapentin last night and was able to sleep for 5 

hrs


-she states that she is not allergic to the gabapentin so we will trial a 100mg 

TID dosing


-upright weight bearing hip films look fine


-consider ortho evaluation as well 


-labs look fine


-could repeat right C4-5 JUAN CARLOS for neck/arm pain if remains severe, otherwise, we 

will follow this outpatient.  Patient plans to schedule cervical surgery as 

outpatient  


-discussed patient with Dr Garcia


-please call neurosurgery with questions/concerns 


Subjective: 


Awake and alert.  NAD.  Eating/drinking and voiding.  No f/c/n/v/d.  Pt with 

improved lower back and improving left hip pain


Objective: 


AAO x 3, PERRLA/EOMI


no droop


CN 2-12 grossly intact


+lt touch


5/5 BUE/BLE =





Neuro Check Frequency: per routine


Urinary Catheter in Place: No





- Physician


Discussed Patient with Dr.: Garcia





Neurosurgery Physical Exam





- Vitals, I&O, Labs





 I and O











 01/05/19 01/06/19 01/07/19





 05:59 05:59 05:59


 


Intake Total 1500 720 


 


Balance 1500 720 


 


Intake:   


 


  Oral (ml) 1500 720 


 


Other:   


 


  Intake Quantity  Yes 





  Sufficient   


 


  Number of Voids   


 


    Toilet 3 4 








 Vital Signs











Temp Pulse Resp BP Pulse Ox


 


 37.0 C   61   18   123/71 H  94 


 


 01/05/19 23:31  01/05/19 23:31  01/05/19 23:31  01/05/19 23:31  01/05/19 23:31








 Laboratory Results





 01/06/19 05:01 





 01/06/19 05:01 











ICD10 Worksheet


Patient Problems: 


 Problems











Problem Status Onset


 


Back pain Acute  


 


Neck pain Acute  


 


Dizziness Acute  


 


Headache Acute

## 2019-01-07 NOTE — PDIAF
- Diagnosis


Diagnosis: acute on chronic back pain, left hip and groin pain


Code Status: Full Code





- Medication Management


Discharge Medications: electronically signed and located in the Home Medication 

List.





- Orders


Services needed: Physical Therapy, Occupational Therapy


Isolation Type: None


Diet Recommendation: no restrictions on diet


Diet Texture: Regular Texture Diet


Additional Instructions: 


new medications include: Gabapentin and extra oxy IR 5-10 prn (recommending 

weaning this off)


further f/u with Dr Miranda to get an injection in her left hip-in 4 weeks


f/u with her cardiologist in [place] to evaluate her iliac stent


follow up w her pain specialist





- Follow Up Care


Current Providers and Referrals: 


NONE *PRIMARY CARE P,. [Unknown] - As per Instructions


Jada Miranda MD [Medical Doctor] -

## 2019-01-07 NOTE — ASMTLACE
LACE

 

Length of stay for            Answers:  7-13 days                             

current admission                                                             

Acuity / Level of             Answers:  Yes                                   

Care: Did the patient                                                         

have an inpatient                                                             

admission?                                                                    

Comorbidities - select        Answers:  Coronary Artery Disease               

all that apply                                                                

                                        Opioid dependence                     

                                        / Chronic pain                        

                                        Peripheral vascular                   

                                        disease                               

                                        Other                         Notes:  HLD; GERD

# of Emergency department     Answers:  1-2                                   

visits in the last 6                                                          

months                                                                        

Score: 17

 

Date Signed:  01/07/2019 09:13 AM

Electronically Signed By:Kari Johnston RN

## 2019-01-07 NOTE — GDS
DISCHARGE DIAGNOSES:  

1.  Acute back pain and flank pain, as well as left groin and left hip pain.

2.  Fall at home, with injury.

3.  Hyponatremia.

4.  Acute exacerbation of chronic muscular headache syndrome.

5.  Chronic pain, on continuous chronic opioids.

6.  Chronic cervical spine pain issues.

7.  History of peripheral vascular disease, with iliac artery stents in place.



CONSULTATIONS:  

1.  Eden Gotti, nurse practitioner with Neurosurgery.

2.  Dr. Jada Miranda, with Orthopedics.



HISTORY:  Briefly, the patient is a 58-year-old woman who has been seen by Dr. Garcia in regard to cer
vical spine issues.  She has a history of a C5-C6 fusion in 1999 with an outside surgeon and had been
 getting injections at the right C4-5 at [facility].  She presented to the emergency room with left lower ba
ck pain, with spasms that wrapped around into her flank region.  She denied any radicular symptoms.  
She tripped going up some stairs and fell forward, lunging forward.  She is on pain medications chron
icay, including long-acting morphine and Oxy IR.  She had multiple imaging done throughout her stay
, which nothing showed an acute injury.  She had a lumbar steroid injection on January 2.  This was a
n L5 selective nerve root block, with some improvement of her symptoms.  Then, on January 4, she had 
a caudal epidural steroid injection and short-acting anesthetic injection performed.  She did get rel
ief from this.  She continued to have left groin pain and left hip pain.  Subsequently, she was seen 
and evaluated by Orthopedics, who recommended doing a Kenalog injection into her left hip in 4 weeks 
to see if this would give her some relief.  Today, she continues to complain of left groin pain.  I h
ave recommended that she follow up with her cardiologist in [place] to get evaluation of her iliac harriet
nts.  She does have good pulses and no claudication in her left leg.  She will further follow up with
 her doctor in the [place] area.  In the interim, she will go to a skilled nursing facility for rehab 
to see how she does prior to going home.



HOSPITAL COURSE:  

1.  Acute left mid back, flank, left hip and left groin pain.  She is status post an epidural steroid
 injection and caudal injection.  Overall, the pain in her back is much improved.  She continues to h
ave left hip pain and left groin pain.  Further followup with Orthopedics in the outpatient setting, 
as well as her cardiologist.

2.  Fall at home.  Will get physical therapy and occupational therapy.

3.  Hyponatremia, likely due to decreased solute intake.

4.  Acute exacerbation of chronic muscular headache syndromes.  None further.

5.  Chronic pain, on continuous chronic opioids.  She has a pain doctor she sees in the outpatient se
tting.  Her dose of OxyIR has been increased.  Recommending she wean off some of these medications.

6.  Chronic cervical spine issues.  She has no acute injury or problems with the cervical spine, othe
r than a flare-up at this time.  She is planning on having cervical spine surgery in the outpatient s
etting, eventually further followup with Dr. Garcia.

7.  Known history of peripheral vascular disease, with iliac artery stents.  She has good pulses.  No
 swelling.  Will have her follow up with her cardiologist.



DISCHARGE CONDITION:  Stable.  Blood pressure is 155/81, heart rate of 71, respiratory rate of 16, O2
 saturation on room air 95%, temperature 36.4 Celsius.



DISCHARGE MEDICATIONS:  Please see the EMR.



DISCHARGE INSTRUCTIONS:  

1.  Recommending she wean off some of her narcotics.

2.  Further followup with Dr. Garcia.

3.  Further followup with Dr. Miranda.

4.  Further followup with her cardiologist.

5.  If she develops fever, chills, chest pain, return to the ER.

6.   

Greater than 30 minutes discharging and coordinating her care.





Job #:  [id number]

## 2019-01-07 NOTE — HOSPPROG
Hospitalist Progress Note


Assessment/Plan: 








Nickie Pelaez  is a 57 y/o female w chronic pain and now w acute pain. C/o left 

buttock and groin pain.  She has stable to mildly progressed spondylosis of the 

lumbar spine on repeat MRI with foraminal stenosis most significant at L45/L5S1

, largely stable on MR compared to 2009.  She also has some edema in the left 

paraspinals noted on STIR noted by radiologist, could be indicative of a muscle 

strain.  SIJ provocative tests are negative, Hip MRI also w/o acute findings. 





#Acute left mid back and flank 


-s/p JUAN CARLOS without much improvement


-relief w caudal injection but continues to have groin pain


-I reviewed the x rays myself and didn't see anything acute


-spoke w Neurosurgery-no surgery indicated


-repeat R C45 JUAN CARLOS for neck/arm pain outpatient.  


-Robaxin, Ibuprofen, narcotics


-c/o ongoing left groin, left hip pain-f/u w Dr Miranda





#Fall at home


- with injury as above


- mechanical fall having tripped





#hyponatremia


-follow





#Acute exacerbation of chronic muscular headache syndrome


-no c/o of this today





#chronic pain on continuous, chronic opioids


-she has a pian doctor she sees in the OP setting


-encouraged her to try other modalities for pain such as water therapy, Rolfing


-appreciated pharmacy getting involved with treating her pain





#Chronic cervical spine pain issues


- ongoing care with Dr. Bowen of Neurosurgery


- no apparent acute injury or problems with the cervical spine other than flare-

up of her usual headache


- she plans on having cervical spine surgery





#Known history of peripheral vascular disease with iliac artery stents


-stable


-has good pulses, no swelling


-will have her f/u with her cardiologist to evaluate





#plan: dc to rehab w close f/u w her cardiologist and Dr Miranda


Subjective: Nickie said many of her areas of pain are better, but the groin pain 

is persistent making it hard for her to ambulate.


Objective: 


 Vital Signs











Temp Pulse Resp BP Pulse Ox


 


 36.4 C   71   16   155/81 H  95 


 


 01/07/19 07:52  01/07/19 07:52  01/07/19 07:52  01/07/19 07:52  01/07/19 07:52








 Laboratory Results





 01/06/19 05:01 





 01/06/19 05:01 





 











 01/06/19 01/07/19 01/08/19





 05:59 05:59 05:59


 


Intake Total 720 1500 


 


Output Total  1450 


 


Balance 720 50 








 











PT  12.3 SEC (12.0-15.0)   12/30/18  15:39    


 


INR  0.89  (0.83-1.16)   12/30/18  15:39    














- Physical Exam


Constitutional: no apparent distress, appears nourished, uncomfortable


Eyes: PERRL


Ears, Nose, Mouth, Throat: hearing normal


Cardiovascular: other (2+ pedal pulses)


Respiratory: no respiratory distress


Skin: warm


Musculoskeletal: generalized weakness


Neurologic: AAOx3


Psychiatric: interacting appropriately





ICD10 Worksheet


Patient Problems: 


 Problems











Problem Status Onset


 


Back pain Acute  


 


Neck pain Acute  


 


Dizziness Acute  


 


Headache Acute

## 2019-01-07 NOTE — NEUSURGPN
Assessment/Plan: 





Assessment: 57 yo female with a hx of chronic pain. Pt presented with mainly 

complaining of left groin, anterior hip pain.  She continues to complain of 

left inguinal pain





Plan:


-pt has a stable to mildly progressed spondylosis of the lumbar spine on repeat 

MRI with foraminal stenosis most significant at L45/L5S1, largely stable on MR 

compared to 2009.  She also has some edema in the left paraspinals noted on 

STIR noted by radiologist, could be indicative of a muscle strain.  SI joint 

provocative tests were negative.  Prior reports that the left hip MRI also w/o 

acute findings to the L spine c/w a strain 


-caudal injection help with gluteal pain. Still with some left inguinal pain. 





-Continue gabapentin so we will trial a 100mg TID dosing


-upright weight bearing hip films look fine


-could repeat right C4-5 JUAN CARLOS for neck/arm pain if remains severe, otherwise, we 

will follow this outpatient.  Patient plans to schedule cervical surgery as 

outpatient  


-discussed patient with Dr Garcia, will s/o at this time. Patient can follow up 

on 4-6weeks as an outpatient 


-please call neurosurgery with questions/concerns 








Subjective: 


gluteal pain improved. Still with some left groin pain. 


Objective: 





AAO x 3, 


CN 2-12 grossly intact


MAEx4 5/5 and equal in BUE and BLE.


+lt touch








- Physician


Discussed Patient with : Radha





Neurosurgery Physical Exam





- Vitals, I&O, Labs





 I and O











 01/06/19 01/07/19 01/08/19





 05:59 05:59 05:59


 


Intake Total 720 1500 


 


Output Total  1450 


 


Balance 720 50 


 


Intake:   


 


  Oral (ml) 720 1500 


 


Output:   


 


  Urine (ml)  1450 


 


    Bedside Commode  1450 


 


Other:   


 


  Intake Quantity Yes Yes 





  Sufficient   


 


  Number of Voids   


 


    Toilet 4  








 Vital Signs











Temp Pulse Resp BP Pulse Ox


 


 36.4 C   71   16   155/81 H  95 


 


 01/07/19 07:52  01/07/19 07:52  01/07/19 07:52  01/07/19 07:52  01/07/19 07:52








 Laboratory Results





 01/06/19 05:01 





 01/06/19 05:01 











ICD10 Worksheet


Patient Problems: 


 Problems











Problem Status Onset


 


Back pain Acute  


 


Neck pain Acute  


 


Dizziness Acute  


 


Headache Acute

## 2019-01-07 NOTE — ASDISCHSUM
----------------------------------------------

Discharge Information

----------------------------------------------

Plan Status:SNF                                      Medically Cleared to Leave:

Discharge Date:01/07/2019 02:25 PM                   CM D/C Disposition:

ADT D/C Disposition:Skilled Nursing Facility         Projected Discharge Date:01/03/2019 11:00 AM

Transportation at D/C:                               Discharge Delay Reason:

Follow-Up Date:01/03/2019 11:00 AM                   Discharge Slot:

Final Diagnosis:

----------------------------------------------

Placement Information

----------------------------------------------

Referral Type:*Nursing Home/SNF                      Referral ID:[id number]

Provider Name:[facility]

Address 1:[address]                    Phone [phone]

Address 2:                                           Fax [phone]

City:[place]                                      Selection Factors:

State:[place]

 

----------------------------------------------

Patient Contact Information

----------------------------------------------

Contact Name:ROZJENNIFERKRISTEN                               Relationship:Daughter

Address:                                             Home [phone]

                                                     Work Phone:

City:                                                [facility] Phone:

[facility] Code:                                      Email:

----------------------------------------------

Financial Information

----------------------------------------------

Financial Class:Medicare

Primary Plan Desc:MEDICARE INPATIENT                 Primary Plan Number:[id number]

Secondary Plan Desc[place]      Secondary Plan Number:[id number]

 

 

----------------------------------------------

Assessment Information

----------------------------------------------

----------------------------------------------

LACE

----------------------------------------------

LACE

 

Length of stay for            Answers:  7-13 days                             

current admission                                                             

Acuity / Level of             Answers:  Yes                                   

Care: Did the patient                                                         

have an inpatient                                                             

admission?                                                                    

Comorbidities - select        Answers:  Coronary Artery Disease               

all that apply                                                                

                                        Opioid dependence                     

                                        / Chronic pain                        

                                        Peripheral vascular                   

                                        disease                               

                                        Other                         Notes:  HLD; GERD

# of Emergency department     Answers:  1-2                                   

visits in the last 6                                                          

months                                                                        

Score: 17

 

Date Signed:  01/07/2019 09:13 AM

Electronically Signed By:Kari Johnston RN

 

 

----------------------------------------------

[facility] Initial CM Assessment

----------------------------------------------

Living Arrangements

 

What is your living           Answers:  With Child(manish)                       

arrangement? Who do you                                                       

live with?                                                                    

Type Of Residence

 

What kind of residence do     Answers:  Apartment                             

you live in?                                                                  

Discharge Plan Comments

 

Coordination Status           

Comments                      

Notes:

Patient is a 59yo single female who fell at home and is experiencing acute left mid back and flank 

pain, severe spasm, impaired mobility and an acute exacerbation of chronic muscular headache 

syndrome. She will be here for observation, pain management, and PT/OT. D/C plan TBD. CM will 

follow.

 

Date Signed:  12/31/2018 11:06 AM

Electronically Signed By:Anaya Allan LCSW

 

 

----------------------------------------------

[facility] CM Progress Note

----------------------------------------------

CM Note

 

CM Note                       

Notes:

OT rec SNF, PT rec home. Spoke with pt about SNF, reports she will need to go to SNF because due to 


the pain she "has no choice." Pt [facility] Pt may choose 

[facility]. CM to follow. 

 

Date Signed:  01/01/2019 01:07 PM

Electronically Signed By:SEA Hart

 

 

----------------------------------------------

[facility] MORGAN Progress Note

----------------------------------------------

CM Note

 

CM Note                       

Notes:

Pt chooses [facility] for d/c, likely tomorrow. Taisha at [facility] updated. CM to follow. 

 

Date Signed:  01/04/2019 02:17 PM

Electronically Signed By:SEA Hart

 

 

----------------------------------------------

Case Management Discharge Plan Note

----------------------------------------------

Case Management Discharge

 

Discharge Order Complete?     Answers:  Yes                                   

Patient to Obtain             Answers:  Other                         Notes:  [facility]

Medications                                                                   

Transportation Arranged       Answers:  Other                         Notes:  [facility]

Transport will Pick (Date     01/07/2019 02:00 PM

& Time)                       

Faxed Final Orders            Answers:  Yes                                   

Discharge Comments            

Notes:

Patient [facility] 

 

Date Signed:  01/07/2019 10:11 AM

Electronically Signed By:Kari Johnston RN

 

 

----------------------------------------------

Intervention Information

----------------------------------------------

Intervention Type:*BARNETT-Signed                       Date of Service:12/31/2018 11:38 AM

Patient Type:Observation                             Staff Member:Robina Bautista

Hours:                                               Discipline:

Severity:                                            Comment:

Intervention Type:*IM-Signed                         Date of Service:01/04/2019 03:05 PM

Patient Type:Inpatient                               Staff Member:Robina Bautista

Hours:                                               Discipline:

Severity:                                            Comment:

## 2019-01-07 NOTE — ASMTDCNOTE
Case Management Discharge

 

Discharge Order Complete?     Answers:  Yes                                   

Patient to Obtain             Answers:  Other                         Notes:  [facility]

Medications                                                                   

Transportation Arranged       Answers:  Other                         Notes:  [facility]

Transport will Pick (Date     01/07/2019 02:00 PM

& Time)                       

Faxed Final Orders            Answers:  Yes                                   

Discharge Comments            

Notes:

Patient discharged to [facility]. Transport arranged by Taisha @ [facility]. PARAM De La Torre to call report. 

 

Date Signed:  01/07/2019 10:11 AM

Electronically Signed By:Kari Johnston RN

## 2019-04-20 NOTE — EDPHY
H & P


Time Seen by Provider: 04/20/19 19:58


HPI/ROS: 





CHIEF COMPLAINT:  Abdominal pain





HISTORY OF PRESENT ILLNESS:  Patient says she has had these symptoms for at 

least the last several months.  She had admission in the end of last year for 

abdominal pain and back pain.  She sees Dr. Jaswant Dhaliwal from Gastroenterology 

and has a CT scan scheduled for this week. 


She says that she now has severe left lower quadrant abdominal pain which is 

worse when she pushes on it.  Started this morning around 7:00 a.m. About an 

hour after waking up.  It is not better with her usual oral long-acting 

morphine.  Does not radiate, not associated with vomiting or diarrhea.  Does 

have associated severe nausea.  Describes associated mucus in her stool.





REVIEW OF SYSTEMS:


Eye: no change in vision, sore eyes bilaterally not new


ENT: for 10 days, sore throat


Cardiac: no chest pain or syncope


Pulmonary: no cough or SOB


Abdomen: no vomiting, diarrhea; has nausea


Musculoskeletal:  On narcotics for chronic back pain 60 mg extended release 

morphine twice a day, previous spinal surgery, joint pains


Skin: ecchymosis both forearms


Neuro: mild headache


Constitutional: no fever


: no urinary symptoms





A comprehensive 10 point review of systems is otherwise negative aside from 

elements mentioned in the history of present illness.





PAST MEDICAL HISTORY:  Includes previous spine surgery cervical and lumbar with 

chronic pain, peripheral vascular disease and coronary disease.  Iliac artery 

stents.  History of hyponatremia. Hysterectomy, cholecystectomy.  Ulcerative 

colitis.





Social history:  Former smoker, retired nurse





General Appearance: Alert and conversant, cooperative.


Eyes: No scleral  icterus. 


ENT, Mouth: Normal mucous membranes. Normal pharynx, normal voice, no trismus.


Respiratory: Normal respiratory effort, breath sounds equal, lungs are clear to 

auscultation.


Cardiovascular:  Regular rate and rhythm.


Gastrointestinal:  Left lower quadrant abdominal pain and tenderness but no 

rebound or guarding and no hernia.


Neurological: Alert, face symmetric, normal motor and sensory in extremities. 


Skin: scattered ecchymosis both forearms


Musculoskeletal: No peripheral edema.


Psychiatric: Not agitated.





Emergency Department course/MDM:





Plan for IV pain medication, Chem 8, CT scan abdomen and pelvis with IV 

contrast.


Dilaudid 1mg IV, zofran 4mg IV.





2047:  Normal abdominal pelvis CT per Dr. Bradley.


Results discussed with the patient.  Intractable pain despite high-dose oral 

narcotics at home.  Admission hospitalist service with gastroenterology 

consultation.


2101:  [facility] will consult tomorrow for [place].


2108:  [facility] will admit for hospitalist.


Smoking Status: Former smoker


Constitutional: 


 Initial Vital Signs











Temperature (C)  36.6 C   04/20/19 20:00


 


Heart Rate  89   04/20/19 20:00


 


Respiratory Rate  20   04/20/19 20:00


 


Blood Pressure  162/94 H  04/20/19 20:00


 


O2 Sat (%)  97   04/20/19 20:00








 











O2 Delivery Mode               Room Air














Allergies/Adverse Reactions: 


 





Penicillins Allergy (Severe, Verified 04/20/19 20:00)


 ANAPHYLACTIC


meperidine HCl [From Demerol] Allergy (Intermediate, Verified 04/20/19 20:00)


 ANXIOUS


prednisone [Prednisone] Allergy (Intermediate, Verified 04/20/19 20:00)


 Other-Enter Comments


Sulfa (Sulfonamide Antibiotics) Allergy (Intermediate, Verified 04/20/19 20:00)


 NAUSEA


clopidogrel bisulfate [From Plavix] Allergy (Verified 04/20/19 20:00)


 Rash


ioversol [From Optiray 160] Allergy (Verified 04/20/19 20:00)


 


Quinolones Allergy (Verified 04/20/19 20:00)


 Other-Enter Comments








Home Medications: 














 Medication  Instructions  Recorded


 


Diclofenac Sodium 1% [Voltaren Gel 4 gm TP QID PRN 08/26/18





(*)]  


 


Pantoprazole Sodium [Protonix 40mg 40 mg PO BIDMEAL 08/26/18





(*)]  


 


morphINE SR [MS Contin/Oramorph 60 60 mg PO BID@06,18 08/26/18





mg (*)]  


 


oxyCODONE IR [Oxycodone Ir (*)] 10 mg PO HS PRN 08/26/18


 


oxyCODONE IR [Oxycodone Ir (*)] 10 mg PO QID@06,10,14,18 08/26/18


 


Aspirin [Aspirin 81mg (*)] 81 mg PO Q2D 08/29/18


 


Methocarbamol [Robaxin 750 mg (*)] 750 mg PO TID PRN #60 tab 08/29/18


 


Doxepin HCl [Silenor] 3 mg PO HS 12/30/18


 


Fexofenadine HCl [Allegra Allergy] 180 mg PO DAILY 12/30/18


 


Gabapentin [Neurontin 300 MG (*)] 300 mg PO HS 12/30/18


 


Acetaminophen [Tylenol  mg 1,000 mg PO Q8HRS  tab 01/07/19





(*)]  


 


Gabapentin [Neurontin 100 MG (*)] 100 mg PO TID  cap 01/07/19


 


Ibuprofen [Motrin (*)] 400 mg PO Q6HRS PRN  tab 01/07/19


 


Lidocaine 4%/Menthol 1% [Icy Hot 1 patch TD DAILY  patch 01/07/19





Lidocaine/Menthol 4%/1% Patch (*)]  


 


Polyethylene Glycol 3350 [Miralax 17 gm PO DAILY PRN  pkt 01/07/19





17 gm (*)]  


 


Sennosides/Docusate Sodium 1 - 2 tab PO BID  tab 01/07/19





[Senokot-S]  


 


oxyCODONE IR [Oxycodone Ir (*)] 5 - 10 mg PO Q3HRS PRN  tab 01/07/19














Medical Decision Making


Differential Diagnosis: 





Differential considered including but not limited to diverticulitis, bowel 

obstruction, intra-abdominal abscess, appendicitis





- Data Points


Laboratory Results: 


 Laboratory Results





 04/20/19 20:00 





 04/20/19 20:00 





 











  04/20/19 04/20/19 04/20/19





  20:13 20:00 20:00


 


WBC      6.15 10^3/uL 10^3/uL





     (3.80-9.50) 


 


RBC      3.96 10^6/uL L 10^6/uL





     (4.18-5.33) 


 


Hgb      12.7 g/dL g/dL





     (12.6-16.3) 


 


POC Hgb  13.3 gm/dL gm/dL    





   (12.6-16.3)   


 


Hct      37.3 % L %





     (38.0-47.0) 


 


POC Hct  39 % %    





   (38-47)   


 


MCV      94.2 fL fL





     (81.5-99.8) 


 


MCH      32.1 pg pg





     (27.9-34.1) 


 


MCHC      34.0 g/dL g/dL





     (32.4-36.7) 


 


RDW      12.8 % %





     (11.5-15.2) 


 


Plt Count      216 10^3/uL 10^3/uL





     (150-400) 


 


MPV      9.2 fL fL





     (8.7-11.7) 


 


Neut % (Auto)      52.9 % %





     (39.3-74.2) 


 


Lymph % (Auto)      36.1 % %





     (15.0-45.0) 


 


Mono % (Auto)      9.6 % %





     (4.5-13.0) 


 


Eos % (Auto)      0.3 % L %





     (0.6-7.6) 


 


Baso % (Auto)      0.8 % %





     (0.3-1.7) 


 


Nucleat RBC Rel Count      0.0 % %





     (0.0-0.2) 


 


Absolute Neuts (auto)      3.25 10^3/uL 10^3/uL





     (1.70-6.50) 


 


Absolute Lymphs (auto)      2.22 10^3/uL 10^3/uL





     (1.00-3.00) 


 


Absolute Monos (auto)      0.59 10^3/uL 10^3/uL





     (0.30-0.80) 


 


Absolute Eos (auto)      0.02 10^3/uL L 10^3/uL





     (0.03-0.40) 


 


Absolute Basos (auto)      0.05 10^3/uL 10^3/uL





     (0.02-0.10) 


 


Absolute Nucleated RBC      0.00 10^3/uL 10^3/uL





     (0-0.01) 


 


Immature Gran %      0.3 % %





     (0.0-1.1) 


 


Immature Gran #      0.02 10^3/uL 10^3/uL





     (0.00-0.10) 


 


POC Sodium  137 mEq/L mEq/L    





   (135-145)   


 


Sodium    134 mEq/L L mEq/L  





    (135-145)  


 


POC Potassium  3.6 mEq/L mEq/L    





   (3.3-5.0)   


 


Potassium    3.9 mEq/L mEq/L  





    (3.5-5.2)  


 


POC Chloride  100 mEq/L mEq/L    





   ()   


 


Chloride    100 mEq/L mEq/L  





    ()  


 


Carbon Dioxide    23 mEq/l mEq/l  





    (22-31)  


 


POC Total CO2  24 mEq/L mEq/L    





   (22-31)   


 


Anion Gap    11 mEq/L mEq/L  





    (6-14)  


 


POC BUN  7 mg/dL mg/dL    





   (7-23)   


 


BUN    9 mg/dL mg/dL  





    (7-23)  


 


Creatinine    0.7 mg/dL mg/dL  





    (0.6-1.0)  


 


POC Creatinine  0.8 mg/dL mg/dL    





   (0.6-1.0)   


 


Estimated GFR    > 60   





    


 


Glucose    89 mg/dL mg/dL  





    ()  


 


POC Glucose  91 mg/dL mg/dL    





   ()   


 


Calcium    9.1 mg/dL mg/dL  





    (8.5-10.4)  











Medications Given: 


 








Discontinued Medications





Hydromorphone HCl (Dilaudid)  1 mg IVP EDNOW ONE


   Stop: 04/20/19 20:14


   Last Admin: 04/20/19 20:21 Dose:  1 mg


Sodium Chloride (Ns)  1,000 mls @ 0 mls/hr IV EDNOW ONE; Wide Open


   PRN Reason: Protocol


   Stop: 04/20/19 20:09


   Last Admin: 04/20/19 20:20 Dose:  1,000 mls


Ondansetron HCl (Zofran)  4 mg IVP EDNOW ONE


   Stop: 04/20/19 20:14


   Last Admin: 04/20/19 20:21 Dose:  4 mg





Point of Care Test Results: 


 Chemistry











  04/20/19





  20:13


 


POC Sodium  137 mEq/L mEq/L





   (135-145) 


 


POC Potassium  3.6 mEq/L mEq/L





   (3.3-5.0) 


 


POC Chloride  100 mEq/L mEq/L





   () 


 


POC Total CO2  24 mEq/L mEq/L





   (22-31) 


 


POC BUN  7 mg/dL mg/dL





   (7-23) 


 


POC Creatinine  0.8 mg/dL mg/dL





   (0.6-1.0) 


 


POC Glucose  91 mg/dL mg/dL





   () 








 ISTAT H&H











  04/20/19





  20:13


 


POC Hgb  13.3 gm/dL gm/dL





   (12.6-16.3) 


 


POC Hct  39 % %





   (38-47) 














Departure





- Departure


Disposition: [facility] Inpatient Acute


Clinical Impression: 


Abdominal pain


Qualifiers:


 Abdominal location: left lower quadrant Qualified Code(s): R10.32 - Left lower 

quadrant pain





Condition: Good

## 2019-04-21 NOTE — PDGENHP
History and Physical





- Chief Complaint


LLQ pain





- History of Present Illness


60 yo F w/ hx of chronic pain on chronic opiates and hx of PVD with iliac stent 

in place presents with acute on chronic abdominal pain. The patient was 

admitted here in January of this year for management of neck pain, back pain, 

and LLQ pain. She tells me these issues have essentially continued. Over the 

last week her LLQ pain has particularly worsened. Today it worsened to the 

point where her home opiates were not sufficient. She also describes 

intermittent fevers, chills, body aches, and diffuse joint pains. She tells me 

her stool has been changing in quality on a daily basis. Her stools have 

changed from loose, to hard, to dark tarry on a day to day basis. She tells me 

she has not had a BM today. She denies BRBPR. She has a colonoscopy about 1 

year ago for similar symptoms that she tells me demonstrated some colonic 

inflammation but no clear pathology. She thinks she may have had a UC flare 

about 20 years ago. In the ED her evaluation is reassuring including laboratory 

work-up and CT. Her abdomen is soft but she is tender only in the LLQ.





Case discussed with Dr. Cushing; records reviewed and summarized above.





History Information





- Allergies/Home Medication List


Allergies/Adverse Reactions: 








Penicillins Allergy (Severe, Verified 04/20/19 20:00)


 ANAPHYLACTIC


meperidine HCl [From Demerol] Allergy (Intermediate, Verified 04/20/19 20:00)


 ANXIOUS


prednisone [Prednisone] Allergy (Intermediate, Verified 04/20/19 20:00)


 Other-Enter Comments


Sulfa (Sulfonamide Antibiotics) Allergy (Intermediate, Verified 04/20/19 20:00)


 NAUSEA


clopidogrel bisulfate [From Plavix] Allergy (Verified 04/20/19 20:00)


 Rash


ioversol [From Optiray 160] Allergy (Verified 04/20/19 20:00)


 


Quinolones Allergy (Verified 04/20/19 20:00)


 Other-Enter Comments





Home Medications: 








Diclofenac Sodium 1% [Voltaren Gel (*)] 4 gm TP QID PRN 08/26/18 [Last Taken 09/ 19/18]


Pantoprazole Sodium [Protonix 40mg (*)] 40 mg PO BIDMEAL 08/26/18 [Last Taken 04 /20/19]


morphINE SR [MS Contin/Oramorph 60 mg (*)] 60 mg PO BID@06,18 08/26/18 [Last 

Taken 04/19/19]


Aspirin [Aspirin 81mg (*)] 81 mg PO Q2D 08/29/18 [Last Taken 04/20/19]


Fexofenadine HCl [Allegra Allergy] 180 mg PO DAILY PRN 12/30/18 [Last Taken 12/ 30/18 06:00]


Gabapentin [Neurontin 300 MG (*)] 300 mg PO HS 12/30/18 [Last Taken 04/19/19]


Acetaminophen [Tylenol  mg (*)] 1,000 mg PO Q8HRS PRN 04/20/19 [Last 

Taken Unknown]


Docusate Sodium [Colace 100 MG (*)] 100 mg PO BID PRN 04/20/19 [Last Taken 

Unknown]


Levocetirizine Dihydrochloride [Xyzal] 5 mg PO DAILY PRN 04/20/19 [Last Taken 

Unknown]


Lidocaine 4%/Menthol 1% [Icy Hot Lidocaine/Menthol 4%/1% Patch (*)] 1 patch TD 

DAILY PRN 04/20/19 [Last Taken Unknown]


Losartan Potassium [Cozaar 50 mg (*)] 50 mg PO HS 04/20/19 [Last Taken 04/19/19]


Olopatadine HCl [Pazeo] 2.5 ml EACHEYE DAILY 04/20/19 [Last Taken 04/20/19]


Tears/Dextran 70/Hypromellose [Natural Balance Tears (*)] 1 drop EACHEYE Q2 PRN 

04/20/19 [Last Taken Unknown]


oxyCODONE HCL/ACETAMINOPHEN [Percocet  mg Tablet] 1 each PO TID 04/20/19 [

Last Taken 04/20/19 14:00]


oxyCODONE/APAP 5/325 [Percocet 5/325 (*)] 1 tab PO HS 04/20/19 [Last Taken 04/19 /19]


tiZANidine HCL [Zanaflex 2MG (*)] 4 mg PO Q6 PRN 04/20/19 [Last Taken 04/19/19]





I have personally reviewed and updated: family history, medical history





- Past Medical History


peripheral artery disease


Additional medical history: Chronic pain





- Surgical History


Additional surgical history: Iliac stent





- Family History


Positive for: cancer (Colon)





- Social History


Smoking Status: Former smoker





Review of Systems


Review of Systems: 





ROS: 10pt was reviewed & negative except for what was stated in HPI & below





Physical Exam


Physical Exam: 

















Temp Pulse Resp BP Pulse Ox


 


 36.5 C   68   16   100/61   94 


 


 04/21/19 00:11  04/21/19 00:11  04/21/19 00:11  04/21/19 00:11  04/21/19 00:11











Constitutional: appears nourished, uncomfortable


Eyes: PERRL, EOMI


Ears, Nose, Mouth, Throat: moist mucous membranes, no oral mucosal ulcers


Cardiovascular: regular rate and rhythym, no murmur, rub, or gallop


Respiratory: no respiratory distress, clear to auscultation


Gastrointestinal: normoactive bowel sounds, tenderness (LLQ), No guarding, No 

rebound, No distension


Skin: warm, normal color


Musculoskeletal: full muscle strength, no muscle tenderness


Neurologic: AAOx3, CN II-XII Intact


Psychiatric: interacting appropriately, not anxious





Lab Data & Imaging Review





 04/20/19 20:00





 04/20/19 20:00














WBC  6.15 10^3/uL (3.80-9.50)   04/20/19  20:00    


 


RBC  3.96 10^6/uL (4.18-5.33)  L  04/20/19  20:00    


 


Hgb  12.7 g/dL (12.6-16.3)   04/20/19  20:00    


 


POC Hgb  13.3 gm/dL (12.6-16.3)   04/20/19  20:13    


 


Hct  37.3 % (38.0-47.0)  L  04/20/19  20:00    


 


POC Hct  39 % (38-47)   04/20/19  20:13    


 


MCV  94.2 fL (81.5-99.8)   04/20/19  20:00    


 


MCH  32.1 pg (27.9-34.1)   04/20/19  20:00    


 


MCHC  34.0 g/dL (32.4-36.7)   04/20/19  20:00    


 


RDW  12.8 % (11.5-15.2)   04/20/19  20:00    


 


Plt Count  216 10^3/uL (150-400)   04/20/19  20:00    


 


MPV  9.2 fL (8.7-11.7)   04/20/19  20:00    


 


Neut % (Auto)  52.9 % (39.3-74.2)   04/20/19  20:00    


 


Lymph % (Auto)  36.1 % (15.0-45.0)   04/20/19  20:00    


 


Mono % (Auto)  9.6 % (4.5-13.0)   04/20/19  20:00    


 


Eos % (Auto)  0.3 % (0.6-7.6)  L  04/20/19  20:00    


 


Baso % (Auto)  0.8 % (0.3-1.7)   04/20/19  20:00    


 


Nucleat RBC Rel Count  0.0 % (0.0-0.2)   04/20/19  20:00    


 


Absolute Neuts (auto)  3.25 10^3/uL (1.70-6.50)   04/20/19  20:00    


 


Absolute Lymphs (auto)  2.22 10^3/uL (1.00-3.00)   04/20/19  20:00    


 


Absolute Monos (auto)  0.59 10^3/uL (0.30-0.80)   04/20/19  20:00    


 


Absolute Eos (auto)  0.02 10^3/uL (0.03-0.40)  L  04/20/19  20:00    


 


Absolute Basos (auto)  0.05 10^3/uL (0.02-0.10)   04/20/19  20:00    


 


Absolute Nucleated RBC  0.00 10^3/uL (0-0.01)   04/20/19  20:00    


 


Immature Gran %  0.3 % (0.0-1.1)   04/20/19  20:00    


 


Immature Gran #  0.02 10^3/uL (0.00-0.10)   04/20/19  20:00    


 


POC Sodium  137 mEq/L (135-145)   04/20/19  20:13    


 


Sodium  134 mEq/L (135-145)  L  04/20/19  20:00    


 


POC Potassium  3.6 mEq/L (3.3-5.0)   04/20/19  20:13    


 


Potassium  3.9 mEq/L (3.5-5.2)   04/20/19  20:00    


 


POC Chloride  100 mEq/L ()   04/20/19  20:13    


 


Chloride  100 mEq/L ()   04/20/19  20:00    


 


Carbon Dioxide  23 mEq/l (22-31)   04/20/19  20:00    


 


POC Total CO2  24 mEq/L (22-31)   04/20/19  20:13    


 


Anion Gap  11 mEq/L (6-14)   04/20/19  20:00    


 


POC BUN  7 mg/dL (7-23)   04/20/19  20:13    


 


BUN  9 mg/dL (7-23)   04/20/19  20:00    


 


Creatinine  0.7 mg/dL (0.6-1.0)   04/20/19  20:00    


 


POC Creatinine  0.8 mg/dL (0.6-1.0)   04/20/19  20:13    


 


Estimated GFR  > 60   04/20/19  20:00    


 


Glucose  89 mg/dL ()   04/20/19  20:00    


 


POC Glucose  91 mg/dL ()   04/20/19  20:13    


 


Calcium  9.1 mg/dL (8.5-10.4)   04/20/19  20:00    


 


Urine Color  COLORLESS   04/20/19  21:20    


 


Urine Appearance  CLEAR   04/20/19  21:20    


 


Urine pH  6.0  (5.0-7.5)   04/20/19  21:20    


 


Ur Specific Gravity  1.008  (1.002-1.030)   04/20/19  21:20    


 


Urine Protein  NEGATIVE  (NEGATIVE)   04/20/19  21:20    


 


Urine Ketones  NEGATIVE  (NEGATIVE)   04/20/19  21:20    


 


Urine Blood  NEGATIVE  (NEGATIVE)   04/20/19  21:20    


 


Urine Nitrate  NEGATIVE  (NEGATIVE)   04/20/19  21:20    


 


Urine Bilirubin  NEGATIVE  (NEGATIVE)   04/20/19  21:20    


 


Urine Urobilinogen  NEGATIVE EU (0.2-1.0)   04/20/19  21:20    


 


Ur Leukocyte Esterase  NEGATIVE  (NEGATIVE)   04/20/19  21:20    


 


Urine Glucose  NEGATIVE  (NEGATIVE)   04/20/19  21:20    








Imaging Review: 





 Imaging Impressions





Abdomen CT  04/20/19 20:17


Impression: 


1. No diverticulitis or acute intra-abdominal inflammatory process.


2. Normal appendix. Mild constipation.


 


Findings discussed with Emergency Department physician, MJ LOW  at  4/20/ 2019 20:45.


 


 














Assessment & Plan


Assessment: 








60 yo F w/ hx of chronic pain on chronic opiates and hx of PVD with iliac stent 

in place presents with acute on chronic abdominal pain.








Plan: 


1. LLQ pain - Symptoms are classic for diverticulitis (LLQ pain, fever, chills) 

but CT not consistent with this. She demonstrates 0/4 SIRS criteria here and 

her abdominal exam is reassuring. 


- Admit for observation


- Clear liquids, mIVF


- Pain management order set placed


- GI consulted in ER, will see patient in the morning


2. Chronic pain - Patient has chronic back and neck pain for which she takes 

chronic opiates.


- Pain management order set placed


- Basal morphine SR, gabapentin, tizanidine continued


3. PVD - With history of iliac stenting; unclear if this could be contributing 

to LLQ pain.


4. HTN - Continue losartan


5. GERD - Continue PPI





Diet - Clears, mIVF


Code - Full


Ppx - LMWH


Dispo - Admit under observation status

## 2019-04-21 NOTE — SOAPPROG
SOAP Progress Note


Assessment/Plan: 


Assessment:Plan:





see full dictated consult to follow


llq pain, long term, query narcotic bowel


n/v and heartburn


colon when fully prepped - may take 2 days


egd at same time with anesthesia





Mark Haley MD


[phone]





04/21/19 14:40





Objective: 





 Vital Signs











Temp Pulse Resp BP Pulse Ox


 


 36.3 C   61   18   141/80 H  90 L


 


 04/21/19 08:00  04/21/19 11:15  04/21/19 11:15  04/21/19 11:15  04/21/19 11:15








 Laboratory Results





 04/21/19 04:57 





 04/21/19 04:57 





 











 04/20/19 04/21/19 04/22/19





 05:59 05:59 05:59


 


Intake Total  1020 


 


Balance  1020 














ICD10 Worksheet


Patient Problems: 


 Problems











Problem Status Onset


 


Abdominal pain Acute  


 


Back pain Acute  


 


Dizziness Acute  


 


Headache Acute  


 


Neck pain Acute

## 2019-04-21 NOTE — PDMN
Medical Necessity


Medical necessity: Pt meets IP criteria as of 4/21/2019 per MD and MCG M-05 (

Abdominal pain, undiagnosed); los > 2 mn for ongoing tx and evaluation of 

abdominal pain with melena and mucous passage with stools as well as new 

normocytic anemia with unknown etiology; requiring further workup, pain 

management, IVF, and IV anti-emetics; Hx CAD, PVD, and aortic insufficiency.

## 2019-04-21 NOTE — ASMTCMCOM
CM Note

 

CM Note                       

Notes:

Chart reviewed. Pt admitted for acute on chronic LLQ abd pain, fever, chills, diffuse joint 

pain. Pt's PMH includes chronic back and neck pain on chronic opiates, PVD w/iliac 

stent, HTN, GERD. 



GI consulted. Plan for egd and colonscopy once pt is fully prepped which could take 2 days. 



Pt had been admitted in Jan 2019 and was discharged to [facility]. Unclear if pt has a PCP.



Exact DC needs TBD. CM to follow. 

 

Date Signed:  04/21/2019 03:33 PM

Electronically Signed By:Joanna Camejo RN

## 2019-04-21 NOTE — HOSPPROG
Hospitalist Progress Note


Assessment/Plan: 





This patient is known to me from previous admission in December when she had 

onset of left back, flank, abdominal pain





DIAGNOSES: 


* Acute onset left lower quadrant abdominal pain, with 1 recent episode of 

melena and current mucous passage with stools


   -etiology of this is unknown, but she tells me that sigmoidoscopy in 

February showed some inflammatory change but with poor 


     visualization because of the prep


   -she does have new anemia which may be potentially related to a colonic 

process if there is 1 present


* New normocytic anemia seen today


 -uncertain etiology, question relation to bowel symptoms


* Chronic musculoskeletal pains multiple locations and headaches, chronic daily 

prescribed narcotic use moderately high dose


* Coronary artery disease, peripheral vascular disease, currently stable


* Aortic insufficiency


* History of cervical and lumbar fusion surgeries


* History of traumatic brain injury with chronic memory deficit








PLANS:


* Await gastroenterology consultation, I will review with Dr. Haley when he 

sees her


* Continue pain management efforts, will try to avoid increasing narcotic, use 

other medications and therapies


* Recheck hemoglobin in morning


* Check iron studies and reticulocyte count to begin assessment of anemia cause





SUBJECTIVE:








OBJECTIVE


Vitals reviewed:  All stable without fever





Exam:


alert oriented 


skin warm dry color ok


resps not labored


lungs clear BSs


heart regular


abd soft nondistended, bowel sounds present; there is 1 very focal area of 

tenderness in the left lower quadrant with some guarding but no rebound and no 

palpable abnormality, otherwise abdominal palpation exam normal


limbs warm, no edema





iv site ok





Imaging:


I reviewed the images from her CT scan abdomen which did not show any specific 

abnormalities explaining her pain





Lab data:


Hemoglobin decreased to 10, normocytic, otherwise normal CBC


Normal basic metabolic panel





On review of her chart I find records here of colonoscopy in 2011 and 

colonoscopy EGD in 2016. Those studies revealed some benign colon polyps but no 

other abnormalities including normal findings on random colon biopsies.  I do 

not have the records available from her recent sigmoidoscopy which was done in 

the outpatient setting elsewhere  





Objective: 


 Vital Signs











Temp Pulse Resp BP Pulse Ox


 


 36.3 C   63   16   143/67 H  94 


 


 04/21/19 08:00  04/21/19 08:00  04/21/19 08:00  04/21/19 08:00  04/21/19 08:00








 Laboratory Results





 04/21/19 04:57 





 04/21/19 04:57 





 











 04/20/19 04/21/19 04/22/19





 06:59 06:59 06:59


 


Intake Total  1020 


 


Balance  1020 














- Time Spent With Patient


Time Spent with Patient: greater than 35 minutes


Time Spent with Patient: Greater than 35 minutes spent on this patients care, 

greater than 50% of time spent counseling, educating, and coordinating care 

regarding the above mentioned plan.





ICD10 Worksheet


Patient Problems: 


 Problems











Problem Status Onset


 


Abdominal pain Acute  


 


Back pain Acute  


 


Dizziness Acute  


 


Headache Acute  


 


Neck pain Acute

## 2019-04-21 NOTE — GCON
[f 
rep st]



                                                                    CONSULTATION





DATE OF CONSULTATION:  04/21/2019



REFERRING PHYSICIAN:  Dr. Galeano



ADDITIONAL REQUESTING PHYSICIAN:  Dr. Marcelo in the hospital.



INDICATION FOR CONSULTATION:  Left lower quadrant pain.



HISTORY OF PRESENT ILLNESS:  The patient is a pleasant 59-year-old female, with 
a past medical history significant for chronic pain, on numerous narcotics, is 
well known to our outpatient GI service from her previous colonoscopies as well 
as evaluation of her abdominal pain.  It has been thought that she has narcotic 
bowel, and much of her pain is related to that.  She is somewhat resistant to 
the idea that she needs more laxatives as she says that she has a bowel 
movement every day, even though we think that she is still constipated 
nonetheless.  She also has a history of pelvic floor dysfunction and has not 
gotten any biofeedback therapy since that was documented.  She has been having 
this left lower quadrant pain for a number of years.  It has been significantly 
exacerbated recently, and that is why she presented for the emergency room.  
She was seen by my nurse practitioners in November, February, and April of this 
year for her left lower quadrant pain.  She underwent imaging studies, as well 
as flexible sigmoidoscopy, which did not reveal any significant abnormality, 
but was a poor prep.  



She does have a significant family history for cancers on her mother's side and 
there is a question of whether they would have a genetic abnormality, and her 
mother has been requested to get genetic testing and then the patient if 
necessary. 



The patient, in significant exacerbation of left lower quadrant pain, presented 
to the emergency room for evaluation.  She has been scheduled for an outpatient 
CT scan, and this was performed when she came in.  Because of her ongoing 
symptoms, I am called to help and evaluate and treat in that regard.  



There were no true relieving or aggravating factors for her pain.  It always 
does seem to be in the left lower quadrant without any significant radiation.  
She also does complain of heartburn as well as nausea, vomiting, and states she 
has been taking Tums significantly over the last number of weeks.  She 
describes her abdominal discomfort and nausea and vomiting to her steroid 
injections that she has received in the past.



PAST MEDICAL HISTORY:  Peripheral vascular disease, chronic pain.



PAST SURGICAL HISTORY:  Iliac stents, hysterectomy, cholecystectomy, 
laminectomies, 2 neck surgeries, and 3 shoulder surgeries on both sides.



FAMILY HISTORY:  Mother with uterine cancer under age 50, colon cancer, 
maternal aunt, early-onset stomach cancer in a maternal grandfather.



SOCIAL HISTORY:  She quit tobacco in 2013.  Reportedly no alcohol.



MEDICATIONS: Tylenol prn, Norco prn, ASA 81mg QOD, Zyrtec 10mg PRN, Lovenox 
40mg daily, Neurontin 100mg tid and 300mg PO QHS, Cozaar 50mg QHS, MSContin 
60mg BID, Oxycodone PRN, Zofran prn,  Pantoprazole 40mg BID



ALLERGIES:  Penicillin causes anaphylaxis.  Sulfa causes GI upset.  Plavix 
causes a rash.  Quinolones causes rash and nausea, vomiting.  She also has some 
reaction to IV dye in the past, specifically Optiray and Demerol.  She cannot 
remember her reaction, and prednisone makes her sick to her stomach.



REVIEW OF SYSTEMS:  A complete review of systems was performed and was negative 
other than in the HPI.



PHYSICAL EXAM:  GENERAL:  Chronically ill-appearing female sitting in her bed.  
No acute distress.  VITAL SIGNS:  Temperature is 36.3, blood pressure is 141/80
, pulse is 61, respirations are 18.  She is 94% on room air.  EYES:  Anicteric.
  RINA, EOMI.  MOUTH:  No lesions.  Moist membranes.  NECK:  Supple.  Full 
range of motion.  No JVD.  BACK:  No spine tenderness.  No CVA tenderness.  
LUNGS:  Clear.  CARDIAC:  S1, S2.  Regular rate and rhythm.  No murmurs, rubs 
or gallops appreciated.  ABDOMEN:  Bowel sounds are normal in pitch and 
frequency.  Abdomen is soft with some mild subxiphoid tenderness and moderate 
left lower quadrant tenderness without any guarding or rebound.  No 
hepatosplenomegaly.  EXTREMITIES:  No cyanosis, clubbing, or edema.  NEUROLOGIC
:  Cranial nerves intact.  Nonfocal.  She is alert, oriented x3.  SKIN:  No 
stigmata of advanced liver disease.



LABORATORY DATA:  From today, April 21:  WBC 4.77, hemoglobin 10.7, hematocrit 
31.8, platelet count 177.  Sodium 135, potassium 4.4, chloride 105, bicarb 24, 
BUN 8, creatinine 0.7, glucose 85, calcium 8.5. 



Historical lab data on January 6, 2019, normal liver enzymes, bilirubin 0.4, 
AST 15, ALT 22, albumin 3.7, alkaline phosphatase 82.  C-reactive protein was 
less than 5.  On January 6, 2019, hemoglobin 13.2, hematocrit 37.5.  On 
admission from April 20, hemoglobin 12.7, hematocrit 37.3.



IMAGING:  Abdominal CT scan performed April 20, 2019, with no oral or rectal 
contrast, but IV contrast.  No evidence of diverticulitis or acute 
intraabdominal inflammatory process.  Normal appendix.  Mild constipation.  
Flexible sigmoidoscopy January 29, 2019.  Exam to the left colon.  Preparation 
of colon was fair.  The exam was normal. 



From September 12, 2017, colonoscopy was normal.  The prep was only fair and 
could only identify polyps greater than 6 mm, and they recommended a 3-year 
followup at most. 



From September 7, 2016, anorectal manometry showed motor and sensory 
abnormalities, decreased squeeze pressures, and slow sensation values.  
Biofeedback was recommended, but never performed.  



Colonoscopy and EGD from February 8, 2011:  EGD showed some grade A esophagitis
, small hiatal hernia, diffuse gastric erythema.  Colonoscopy showed 3 small 
sessile polyps - adenomas .  Random colon biopsies do not show any evidence of 
colitis.  



From March 23, 2011, pH monitoring and 24-hour pH with impedance showed 
abnormal upright exposure with 10.5%, with normal being up to 6.3%.  Abnormal 
recumbent exposure of acid at 1.6%, with normal being up to 1.2%, and an 
abnormal Juan M DeMeester score of 26.8, which is above the normal of 22.  She 
had a normal number of reflux episodes, which was 23, expected is less than 48.
  Symptom association was positive for belching and cough, but negative for 
regurgitation. 



From March 9, 2011, HIDA scan with CCK patent ducts, but poor contractility.  
Ejection fraction at 10 minutes was 8%, at 15 minutes was 9%, at 20 minutes was 
15%, and all of these are subnormal.



ASSESSMENT:  

1.  Abdominal pain, left lower quadrant, unclear etiology, but this could be 
consistent with narcotic bowel and constipation.

2.  Epigastric subxiphoid pain and some heartburn, nausea, vomiting.  Symptoms 
most consistent with acid reflux.

3.  Personal history of colon polyps.

4.  Family history of malignancies that could be consistent with hereditary 
nonpolyposis colorectal cancer or Roach syndrome.

5.  History of peripheral vascular disease with iliac stenting.

6.  Hypertension.



RECOMMENDATIONS:  

1.  We will prep for EGD and colonoscopy, although I am not sure that I will 
find an etiology for the patient's pain.

2.  If EGD and colonoscopy are normal or do not explain the patient's pain, 
consider imaging studies of her iliac stent to make sure they are not stenosed.

3.  Treatment of her other medical issues as per hospitalist. 



Thank you for allowing me to partake in this patient's healthcare.  Do not 
hesitate to call me with any questions.





Job #:  [id number]

MTDD

## 2019-04-22 NOTE — GIREPORT
[facility]

Surgical Services - Endoscopy Department

_____________________________________________________________________________________________________
_______

Patient Name: Brooklyn Pelaez                          Procedure Date: 4/22/2019 11:05 AM

MRN: [id number]                                       Account Number: [id number]

Patient Type: Inpatient                              Attending  MD/ ER Physician: Mark Haley MD

_____________________________________________________________________________________________________
_______

 

Procedure:                    Colonoscopy

Indications:                  Abdominal pain in the left lower quadrant, Personal history of colonic 


                              polyps, Abnormal MRI of the GI tract

Providers:                    Mark Haley MD

Referring MD:                 Suzanne Mueller MD

Medicines:                    Propofol per Anesthesia = IV general with spont resps

Complications:                No immediate complications. Estimated blood loss: Minimal.

Description of Procedure:     After obtaining informed consent, the scope was passed under direct vis
ion. 

                              Throughout the procedure, the patient's blood pressure, pulse, and oxyg
en 

                              saturations were monitored continuously. The Colonoscope with irrigatio
n 

                              channel was introduced through the anus and advanced to the terminal il
eum, 

                              with identification of the appendiceal orifice and IC valve. The colono
scopy 

                              was performed without difficulty. The patient tolerated the procedure w
ell. 

                              The quality of the bowel preparation was good.

Findings:                     The digital rectal exam was normal.

                              The terminal ileum appeared normal.

                              A scattered area of mildly altered vascular and erythematous mucosa was
 

                              found in the sigmoid colon, in the transverse colon and in the ascendin
g 

                              colon. Biopsies were taken with a cold forceps for histology. Estimated
 

                              blood loss was minimal.

                              The sigmoid colon, descending colon and cecum appeared normal. Biopsies
 for 

                              histology were taken with a cold forceps from the entire colon for 

                              evaluation of microscopic colitis. Estimated blood loss was minimal.

                              The exam was otherwise without abnormality.

Estimated Blood Loss:         Estimated blood loss was minimal.

Post Op Diagnosis:            - The examined portion of the ileum was normal.

                              - Altered vascular and erythematous mucosa in the sigmoid colon, in the
 

                              transverse colon and in the ascending colon. Biopsied.

                              - The sigmoid colon, descending colon and cecum are normal. Biopsied.

                              - The examination was otherwise normal.

Recommendation:               - Await pathology results.

                              - My office will call with the pathology result with 5-7 days. If you h
ave 

                              not heard from my office by 12-14, do not assume the pathology is jeremías
l, 

                              please call [phone] to get the pathology results.

                              - Repeat colonoscopy in 2 years for surveillance.

                              - Has seen our genetic clinic and they recommended her mother get teste
d, 

                              then patient get tested. If negative then increase interval to 5 years.


                              - [facility] patient to [facility] for [facility] care.

                              - Query narcotic bowel with overflow issue?

                              - Suggest long term laxatives to keep colon clear.

                              - Thank you for allowing me to help in your patient's care. Do not hesi
wong 

                              to call with any questions.

Attending Participation:

     I personally performed the entire procedure.

 

Tera Capone M.D

____________________

Mark Haley MD

4/22/2019 12:47:36 PM

This report has been signed electronicallyMatthew MD Tera

Number of Addenda: 0

 

Note Initiated On: 4/22/2019 11:05 AM

Total Procedure Duration Time 0 hours 19 minutes 8 seconds 

[email or web address]

## 2019-04-22 NOTE — GIREPORT
[facility]

Surgical Services - Endoscopy Department

_____________________________________________________________________________________________________
_______

Patient Name: Brooklyn Pelaez                          Procedure Date: 4/22/2019 11:03 AM

MRN: [id number]                                       Account Number: [id number]

Patient Type: Inpatient                              Attending  MD/ ER Physician: Mark Haley MD

_____________________________________________________________________________________________________
_______

 

Procedure:                    Upper GI endoscopy

Indications:                  Epigastric abdominal pain, Abdominal pain in the left lower quadrant, 

                              Heartburn

Providers:                    Mark Haley MD

Referring MD:                 Suzanne Mueller MD

Medicines:                    Propofol per Anesthesia

Complications:                No immediate complications. Estimated blood loss: Minimal.

Description of Procedure:     After obtaining informed consent, the endoscope was passed under direct
 

                              vision. Throughout the procedure, the patient's blood pressure, pulse, 
and 

                              oxygen saturations were monitored continuously. The Endoscope was intro
duced 

                              through the mouth, and advanced to the third part of duodenum. The uppe
r GI 

                              endoscopy was accomplished without difficulty. The patient tolerated th
e 

                              procedure well.

Findings:                     The examined esophagus was normal.

                              Scattered minimal inflammation characterized by adherent blood, erythem
a and 

                              friability was found in the gastric antrum. Biopsies were taken with a 
cold 

                              forceps for histology. Estimated blood loss was minimal.

                              The examined duodenum was normal. Biopsies for histology were taken wit
h a 

                              cold forceps for evaluation of celiac disease. Estimated blood loss was
 

                              minimal.

                              The exam was otherwise without abnormality.

Estimated Blood Loss:         Estimated blood loss was minimal.

Post Op Diagnosis:            - Normal esophagus.

                              - Gastritis. Biopsied.

                              - Normal examined duodenum. Biopsied.

                              - The examination was otherwise normal.

Recommendation:               - Await pathology results.

                              - My office will call with the pathology result with 5-7 days. If you h
ave 

                              not heard from my office by 12-14, do not assume the pathology is jeremías
l, 

                              please call [phone] to get the pathology results.

                              - Follow an antireflux regimen.

                              - Use Protonix (pantoprazole) 40 mg PO BID. Take 30-60 minutes before 

                              breakfast and dinner.

                              - Use Zantac (ranitidine) 300 mg PO at bedtime for 1 month.

                              - Perform a colonoscopy today.

                              - Return patient to [facility] for ongoing care.

                              - Return to primary care physician as previously scheduled.

                              - Thank you for allowing me to help in your patient's care. Do not hesi
wong 

                              to call with any questions.

Attending Participation:

     I personally performed the entire procedure.

 

Tera Capone M.D

____________________

Mark Haley MD

4/22/2019 12:24:56 PM

This report has been signed electronicallyMattolayinka Haley MD

Number of Addenda: 0

 

Note Initiated On: 4/22/2019 11:03 AM

Total Procedure Duration Time 0 hours 5 minutes 19 seconds 

[email or web address]

## 2019-04-22 NOTE — HOSPPROG
Hospitalist Progress Note


Assessment/Plan: 





Diagnostic studies done during this admission:


-CT scan of abdomen with no acute abnormalities or anything to explain her 

symptoms


-EGD with mild diffuse gastritis


-colonoscopy with nonspecific findings of erythema and inflammatory vascular 

changes that sound mild with biopsies taken pending








DIAGNOSES: 


* Abdominal pain, chronic with an acute left lower quadrant pain with no 

associated laboratory or radiologic findings, question significance of 

colonoscopist abnormality described above


* New normocytic anemia seen today


 -uncertain etiology, question relation to bowel symptoms


* Chronic musculoskeletal pains multiple locations and headaches, chronic daily 

prescribed narcotic use moderately high dose


* Coronary artery disease, peripheral vascular disease, currently stable


* Aortic insufficiency


* History of cervical and lumbar fusion surgeries


* History of traumatic brain injury with chronic memory deficit





I reviewed the results of colonoscopy and EGD today with Dr. Haley.  EGD shows 

some diffuse gastritis.  The patient chronically takes Prilosec twice a day.  

Dr. Haley is recommending addition of Zantac at bedtime at this point.  

Colonoscopy shows diffuse erythema and nonspecific vascular inflammatory 

pattern in the colon without any more specific diagnostic features.  Biopsies 

were taken of this area as well as of small bowel.  No specific treatment 

recommendations until biopsies are back.





PLANS:


* At this point the patient is stable for discharge to home, however her home 

is actually being moved by her family members and assistance to a new home 

today and she will not really have a place to be able to stay until tomorrow.  

Will plan discharge tomorrow morning 4/23


* Added Zantac at bedtime to the proton pump inhibitor


* She will follow up in GI clinic for results of her colonoscopy and any 

treatments for recommendation would be made at that time if necessary


* Regarding her normocytic anemia, would wait to see if there is an 

inflammatory bowel disease that might explain and anemia of chronic disease; if 

not, she may need further evaluation for cause of this which can be done in the 

outpatient setting


* She complains today of diffuse myalgias sore throat and night sweats.  

Yesterday she told me that these symptoms had been present for just a few days 

and I thought there probably viral.  Today in reviewing these symptoms it is 

very difficult to determine from her story when any of these particular 

symptoms really started.  She does tell me that she had seen a rheumatologist 

for these symptoms in the past.  No diagnostic findings were arrived at.  I 

recommend that if again nothing seen on the a colonoscopy biopsies that is very 

revealing she might recheck with the rheumatologist





SUBJECTIVE:


The patient continues to have the same chronic abdominal pain back pain, 

multiple joint pains that she always has.


Today complains of a sore throat, very difficult to determine when this 

actually started as I review with her.  Yesterday she told me it started in the 

last couple days but now she is saying it is chronic.


Today she also mentions chronic night sweats which she has not mentions to me 

before.  Again very difficult to determine how long these have been going on 

but she has had previous assessment for the sweats along with her chronic joint 

pains with the rheumatologist which was unrevealing.


She is hungry and eating well today, no nausea vomiting no bleeding.





OBJECTIVE


Vitals reviewed:  All stable without fever





Exam:


alert oriented, fairly anxious which is typical for her


skin warm dry color ok


resps not labored


lungs clear BSs


heart regular


abd soft nondistended, bowel sounds present; abdomen see med actually nontender 

on exam today with no palpable abnormalities


limbs warm, no edema





iv site ok





Lab data:


Hemoglobin decreased to 10, normocytic, otherwise normal CBC


Normal basic metabolic panel








Objective: 


 Vital Signs











Temp Pulse Resp BP Pulse Ox


 


 36.6 C   52 L  16   141/68 H  93 


 


 04/22/19 15:27  04/22/19 15:27  04/22/19 15:27  04/22/19 15:27  04/22/19 15:27








 











 04/21/19 04/22/19 04/23/19





 06:59 06:59 06:59


 


Intake Total   300


 


Balance   300














ICD10 Worksheet


Patient Problems: 


 Problems











Problem Status Onset


 


Abdominal pain Acute  


 


Back pain Acute  


 


Dizziness Acute  


 


Headache Acute  


 


Neck pain Acute

## 2019-04-23 NOTE — HOSPPROG
Hospitalist Progress Note


Assessment/Plan: 





60 yo F w/ hx of chronic pain on chronic opiates and hx of PVD with iliac stent 

in place presents with acute on chronic abdominal pain. First encounter, chart 

reviewed.  





*abdominal pain, LLQ


-EGD with mild diffuse gastritis


-colonoscopy shows diffuse erythema and nonspecific vascular inflammatory 

pattern in the colon without any more specific diagnostic features


-GI is recommending adding Zantac at night


-f/u w Dr Haley in regards to biopsies





*new normocytic anemia


-further evaluation in OP setting


-reviewed these labs w Nickie





*viral symptoms for several days


-has seen a rheumatologist in the past 


-if nothing seen on colonoscopy to f/u w a rheumatologist





*chronic pain on narcotics


-f/u with her pain doctor


-planning to get back surgery down at [facility] for chronic issues


-hx of cervical and lumbar fusion surgeries





*AI





*hx of TBI


-has chronic memory deficits


-compensates w this by writing things down





*plan: dc home w f/u with GI, rheumatology, poss hematology











Subjective: Debbie is c/o feeling bloated today.


Objective: 


 Vital Signs











Temp Pulse Resp BP Pulse Ox


 


 36.7 C   62   16   140/70 H  96 


 


 04/23/19 07:07  04/23/19 07:07  04/23/19 07:07  04/23/19 07:07  04/23/19 07:07








 











 04/22/19 04/23/19 04/24/19





 05:59 05:59 05:59


 


Intake Total  300 


 


Balance  300 














- Physical Exam


Constitutional: uncomfortable


Eyes: PERRL


Ears, Nose, Mouth, Throat: hearing normal


Cardiovascular: regular rate and rhythym


Respiratory: no respiratory distress


Gastrointestinal: normoactive bowel sounds, other (bloated), No tenderness


Skin: warm


Musculoskeletal: full muscle strength


Neurologic: AAOx3


Psychiatric: interacting appropriately





ICD10 Worksheet


Patient Problems: 


 Problems











Problem Status Onset


 


Abdominal pain Acute  


 


Back pain Acute  


 


Dizziness Acute  


 


Headache Acute  


 


Neck pain Acute

## 2019-04-23 NOTE — ASMTLACE
LACE

 

Length of stay for            Answers:  3 days                                

current admission                                                             

Acuity / Level of             Answers:  Yes                                   

Care: Did the patient                                                         

have an inpatient                                                             

admission?                                                                    

Comorbidities - select        Answers:  Opioid dependence                     

all that apply                          / Chronic pain                        

                                        Peripheral vascular                   

                                        disease                               

                                        Other                         Notes:  HTN, GERD, chronic back
 

                                                                              and neck pain

# of Emergency department     Answers:  1-2                                   

visits in the last 6                                                          

months                                                                        

Score: 13

 

Date Signed:  04/23/2019 10:41 AM

Electronically Signed By:Suzanne Butler

## 2019-04-23 NOTE — GDS
[f 
rep st]



                                                             DISCHARGE SUMMARY





DISCHARGE DIAGNOSES:  

1.  Left lower quadrant abdominal pain.

2.  New normocytic anemia.

3.  Viral symptoms for several days.

4.  Chronic pain, on continuous chronic narcotics.

5.  Aortic insufficiency.

6.  History of traumatic brain injury.



CONSULTATION:  Dr. Mark Haley. 



Briefly, the patient is a 59-year-old woman with a history of chronic pain on 
chronic opiates and history of peripheral vascular disease with iliac stents in 
place, who presented to the emergency room with acute on chronic abdominal 
pain.  She was here in January for management of neck pain, back pain and left 
lower quadrant pain.  The pain worsened to the point where her opiates were not 
sufficient.  She had a CT scan performed in the emergency room which showed no 
diverticulitis or intra abdominal inflammatory process.  This showed normal 
appendix and mild constipation.  Subsequently, she was seen and evaluated by 
Dr. Haley.  On 04/22 she had an upper GI endoscopy that noted that she had 
gastritis.  The plan is for her to be on a PPI twice daily as well as Zantac.  
In addition, she had a colonoscopy that showed scattered area of mildly altered 
vascular and erythematous mucosa.  Thus, descending sigmoid colon and 
transverse colon and ascending colon biopsies were checked at that time.  Today
, she is eating and drinking well.  She did not have any significant pain 
during my evaluation.  She will be discharged home with close followup with Dr. Haley.



HOSPITAL COURSE BY PROBLEM:  

1.  Abdominal pain in the left lower quadrant.  Gastroenterology is 
recommending adding Zantac to her daily regimen and to follow up with Dr. Haley in regard to biopsies.

2.  New normocytic anemia.  I reviewed her studies with her.  Further follow up 
with her primary care provider.  She may need a hematologist for further 
evaluation.

3.  Viral symptoms for several days.  She has seen a rheumatologist in the past 
for this. To follow up with her primary care provider and possible further 
evaluation with a rheumatologist.

4.  Chronic pain.  On narcotics.  She will follow up with her pain doctor.  She 
also is planning to get back surgery down at [facility] to help 
alleviate her back pain.

5.  Aortic insufficiency, stable.

6.  History of traumatic brain injury.  She has chronic memory deficits.  She 
does well with writing things down.



DISCHARGE CONDITION:  Stable.  Blood pressure is 140/70, heart rate is 62, 
respiratory rate 16, O2 sats on room air 96%, temperature is 36.7 Celsius.



MEDICATIONS AT DISCHARGE:  Please see the EMR.



DISCHARGE INSTRUCTIONS:  

1.  To follow up with Dr. Haley if she does not hear from them in the next 12-
14 days.

2.  To take Protonix 40 mg twice daily.  Take 30-60 minutes before breakfast 
and dinner.

3.  Take Zantac 300 mg at bedtime for a month.

4.  Follow up with her primary care doctor in regard to her anemia and to get 
her labs rechecked in 2 weeks.

5.  If her results for her colonoscopy are negative she needs to see a 
rheumatologist for further evaluation.

6.  If no clear-cut etiology is found for anemia, follow up with Hematology.



TIME SPENT:  Greater than 30 minutes discharging and coordinating the patient's 
care.





Job #:  [id number]

MTDD

## 2019-04-23 NOTE — ASMTDCNOTE
Case Management Discharge

 

Discharge Order Complete?     Answers:  Yes                                   

Patient to Obtain             Answers:  via Family                            

Medications                                                                   

Transportation Arranged       Answers:  Family/Friends                        

Transport will Pick (Date     04/23/2019 12:00 AM

& Time)                       

Family Notified               Answers:  Yes                           Notes:  by pt

Discharge Comments            

Notes:

Spoke with pt in the room who seemed anxious regarding impending move today to new 

household, stating movers are at her home right now. Dtr to  patient. Pt moving comfortably 

and independent in the room. Pt comfortable discharging independently and reports she has an 

outpatient PT and unskilled help in the home 2x a day. No CM needs noted at this time. 

 

Date Signed:  04/23/2019 10:38 AM

Electronically Signed By:Suzanne Butler

## 2019-04-23 NOTE — ASDISCHSUM
----------------------------------------------

Discharge Information

----------------------------------------------

Plan Status:Home with No Needs                       Medically Cleared to Leave:04/23/2019

Discharge Date:04/23/2019                            CM D/C Disposition:Home, Routine, Self-Care

ADT D/C Disposition:                                 Projected Discharge Date:04/23/2019

Transportation at D/C:Family                         Discharge Delay Reason:

Follow-Up Date:04/23/2019                            Discharge Slot:

Final Diagnosis:anemia, gastritis, abd pain

----------------------------------------------

Placement Information

----------------------------------------------

----------------------------------------------

Patient Contact Information

----------------------------------------------

Contact Name:JAGJIT                               Relationship:Daughter

Address:                                             Home [phone]

                                                     Work Phone:

City:                                                [facility] Phone:

State/Zip Code:                                      Email:

----------------------------------------------

Financial Information

----------------------------------------------

Financial Class:Medicare

Primary Plan Desc:MEDICARE INPATIENT                 Primary Plan Number:[id number]

Secondary Plan Desc:[employer] [place]      Secondary Plan Number:[id number]

 

 

----------------------------------------------

Assessment Information

----------------------------------------------

----------------------------------------------

[facility] CM Progress Note

----------------------------------------------

CM Note

 

CM Note                       

Notes:

Chart reviewed. Pt admitted for acute on chronic LLQ abd pain, fever, chills, diffuse joint 

pain. Pt's PMH includes chronic back and neck pain on chronic opiates, PVD w/iliac 

stent, HTN, GERD. 



GI consulted. Plan for egd and colonscopy once pt is fully prepped which could take 2 days. 



Pt had been admitted in Jan 2019 and was discharged to [facility]. Unclear if pt has a PCP.



[facility] DC needs TBD. CM to follow. 

 

Date Signed:  04/21/2019 03:33 PM

Electronically Signed By:Joanna Camejo RN

 

 

----------------------------------------------

LACE

----------------------------------------------

LACE

 

Length of stay for            Answers:  3 days                                

current admission                                                             

Acuity / Level of             Answers:  Yes                                   

Care: Did the patient                                                         

have an inpatient                                                             

admission?                                                                    

Comorbidities - select        Answers:  Opioid dependence                     

all that apply                          / Chronic pain                        

                                        Peripheral vascular                   

                                        disease                               

                                        Other                         Notes:  HTN, GERD, chronic back
 

                                                                              and neck pain

# of Emergency department     Answers:  1-2                                   

visits in the last 6                                                          

months                                                                        

Score: 13

 

Date Signed:  04/23/2019 10:41 AM

Electronically Signed By:Suzanne Butler

 

 

----------------------------------------------

Case Management Discharge Plan Note

----------------------------------------------

Case Management Discharge

 

Discharge Order Complete?     Answers:  Yes                                   

Patient to Obtain             Answers:  via Family                            

Medications                                                                   

Transportation Arranged       Answers:  Family/Friends                        

Transport will Pick (Date     04/23/2019 12:00 AM

& Time)                       

Family Notified               Answers:  Yes                           Notes:  by pt

Discharge Comments            

Notes:

Spoke with pt in the room who seemed anxious regarding impending move today to new 

household, stating movers are at her home right now. Dtr to  patient. Pt moving comfortably 

and independent in the room. Pt comfortable discharging independently and reports she has an 

outpatient PT and unskilled help in the home 2x a day. No CM needs noted at this time. 

 

Date Signed:  04/23/2019 10:38 AM

Electronically Signed By:Suzanne Butler

 

 

----------------------------------------------

Intervention Information

----------------------------------------------

Intervention Type:*IM-Signed                         Date of Service:04/23/2019 11:17 AM

Patient Type:Inpatient                               Staff Member:Suzanne Butler

Hours:                                               Discipline:

Severity:                                            Comment:

## 2019-06-17 ENCOUNTER — HOSPITAL ENCOUNTER (EMERGENCY)
Dept: HOSPITAL 80 - CED | Age: 59
Discharge: HOME | End: 2019-06-17
Payer: COMMERCIAL

## 2019-06-20 ENCOUNTER — HOSPITAL ENCOUNTER (INPATIENT)
Dept: HOSPITAL 80 - FED | Age: 59
LOS: 4 days | Discharge: HOME | End: 2019-06-24
Payer: COMMERCIAL
